# Patient Record
Sex: MALE | Race: WHITE | NOT HISPANIC OR LATINO | Employment: OTHER | URBAN - METROPOLITAN AREA
[De-identification: names, ages, dates, MRNs, and addresses within clinical notes are randomized per-mention and may not be internally consistent; named-entity substitution may affect disease eponyms.]

---

## 2018-01-25 ENCOUNTER — TRANSCRIBE ORDERS (OUTPATIENT)
Dept: ADMINISTRATIVE | Facility: HOSPITAL | Age: 48
End: 2018-01-25

## 2018-01-25 DIAGNOSIS — R07.89 OTHER CHEST PAIN: Primary | ICD-10-CM

## 2018-02-13 ENCOUNTER — TRANSCRIBE ORDERS (OUTPATIENT)
Dept: ADMINISTRATIVE | Facility: HOSPITAL | Age: 48
End: 2018-02-13

## 2018-02-13 DIAGNOSIS — R07.89 OTHER CHEST PAIN: Primary | ICD-10-CM

## 2018-02-14 ENCOUNTER — HOSPITAL ENCOUNTER (OUTPATIENT)
Dept: CT IMAGING | Facility: HOSPITAL | Age: 48
End: 2018-02-14

## 2018-02-14 ENCOUNTER — HOSPITAL ENCOUNTER (OUTPATIENT)
Dept: CT IMAGING | Facility: HOSPITAL | Age: 48
Discharge: HOME | End: 2018-02-14
Admitting: PHYSICIAN ASSISTANT

## 2018-02-14 VITALS
RESPIRATION RATE: 18 BRPM | HEIGHT: 70 IN | SYSTOLIC BLOOD PRESSURE: 103 MMHG | WEIGHT: 217 LBS | OXYGEN SATURATION: 96 % | HEART RATE: 58 BPM | DIASTOLIC BLOOD PRESSURE: 68 MMHG | TEMPERATURE: 96.8 F | BODY MASS INDEX: 31.07 KG/M2

## 2018-02-14 DIAGNOSIS — R07.89 OTHER CHEST PAIN: ICD-10-CM

## 2018-02-14 PROCEDURE — 63710000001 METOPROLOL TARTRATE 50 MG TABLET: Performed by: RADIOLOGY

## 2018-02-14 PROCEDURE — 75574 CT ANGIO HRT W/3D IMAGE: CPT

## 2018-02-14 PROCEDURE — A9270 NON-COVERED ITEM OR SERVICE: HCPCS | Performed by: RADIOLOGY

## 2018-02-14 PROCEDURE — 0 IOPAMIDOL PER 1 ML: Performed by: PHYSICIAN ASSISTANT

## 2018-02-14 PROCEDURE — 63710000001 NITROGLYCERIN 0.4 MG SUBLINGUAL TABLET: Performed by: RADIOLOGY

## 2018-02-14 RX ORDER — ATORVASTATIN CALCIUM 20 MG/1
20 TABLET, FILM COATED ORAL DAILY
COMMUNITY

## 2018-02-14 RX ORDER — ASPIRIN 81 MG/1
81 TABLET ORAL DAILY
COMMUNITY

## 2018-02-14 RX ORDER — CETIRIZINE HYDROCHLORIDE 10 MG/1
10 TABLET ORAL DAILY PRN
COMMUNITY

## 2018-02-14 RX ORDER — SODIUM CHLORIDE 0.9 % (FLUSH) 0.9 %
1-10 SYRINGE (ML) INJECTION AS NEEDED
Status: DISCONTINUED | OUTPATIENT
Start: 2018-02-14 | End: 2018-02-15 | Stop reason: HOSPADM

## 2018-02-14 RX ORDER — CYCLOBENZAPRINE HCL 10 MG
10 TABLET ORAL 2 TIMES DAILY PRN
COMMUNITY

## 2018-02-14 RX ORDER — OMEPRAZOLE 20 MG/1
20 CAPSULE, DELAYED RELEASE ORAL DAILY
COMMUNITY

## 2018-02-14 RX ORDER — METOPROLOL TARTRATE 50 MG/1
50 TABLET, FILM COATED ORAL
Status: COMPLETED | OUTPATIENT
Start: 2018-02-14 | End: 2018-02-14

## 2018-02-14 RX ORDER — NITROGLYCERIN 0.4 MG/1
TABLET SUBLINGUAL
Status: COMPLETED | OUTPATIENT
Start: 2018-02-14 | End: 2018-02-14

## 2018-02-14 RX ADMIN — IOPAMIDOL 65 ML: 755 INJECTION, SOLUTION INTRAVENOUS at 09:25

## 2018-02-14 RX ADMIN — METOPROLOL TARTRATE 50 MG: 50 TABLET ORAL at 07:35

## 2018-02-14 RX ADMIN — NITROGLYCERIN 0.4 MG: 0.4 TABLET SUBLINGUAL at 09:15

## 2018-02-14 NOTE — DISCHARGE INSTR - DIET
Resume your usual diet and medications.  Drink at least 8 - 10 glasses of fluid per day for the next few days.

## 2018-02-15 ENCOUNTER — TELEPHONE (OUTPATIENT)
Dept: INTERVENTIONAL RADIOLOGY/VASCULAR | Facility: HOSPITAL | Age: 48
End: 2018-02-15

## 2018-03-13 ENCOUNTER — APPOINTMENT (OUTPATIENT)
Dept: CT IMAGING | Facility: HOSPITAL | Age: 48
End: 2018-03-13

## 2018-12-28 ENCOUNTER — OFFICE (OUTPATIENT)
Dept: URBAN - METROPOLITAN AREA CLINIC 4 | Facility: CLINIC | Age: 48
End: 2018-12-28

## 2018-12-28 VITALS — DIASTOLIC BLOOD PRESSURE: 74 MMHG | HEIGHT: 69 IN | WEIGHT: 222 LBS | SYSTOLIC BLOOD PRESSURE: 109 MMHG

## 2018-12-28 DIAGNOSIS — Z12.11 ENCOUNTER FOR SCREENING FOR MALIGNANT NEOPLASM OF COLON: ICD-10-CM

## 2018-12-28 PROCEDURE — 99213 OFFICE O/P EST LOW 20 MIN: CPT | Performed by: NURSE PRACTITIONER

## 2019-01-08 ENCOUNTER — AMBULATORY SURGICAL CENTER (OUTPATIENT)
Dept: URBAN - METROPOLITAN AREA SURGERY 10 | Facility: SURGERY | Age: 49
End: 2019-01-08

## 2019-01-08 DIAGNOSIS — Z12.11 ENCOUNTER FOR SCREENING FOR MALIGNANT NEOPLASM OF COLON: ICD-10-CM

## 2019-01-08 DIAGNOSIS — K64.0 FIRST DEGREE HEMORRHOIDS: ICD-10-CM

## 2019-01-08 PROCEDURE — G0121 COLON CA SCRN NOT HI RSK IND: HCPCS | Performed by: INTERNAL MEDICINE

## 2021-11-23 ENCOUNTER — OFFICE (OUTPATIENT)
Dept: URBAN - METROPOLITAN AREA CLINIC 4 | Facility: CLINIC | Age: 51
End: 2021-11-23

## 2021-11-23 VITALS — DIASTOLIC BLOOD PRESSURE: 83 MMHG | SYSTOLIC BLOOD PRESSURE: 125 MMHG | WEIGHT: 222 LBS | HEIGHT: 69 IN

## 2021-11-23 DIAGNOSIS — K21.9 GASTRO-ESOPHAGEAL REFLUX DISEASE WITHOUT ESOPHAGITIS: ICD-10-CM

## 2021-11-23 PROCEDURE — 99214 OFFICE O/P EST MOD 30 MIN: CPT | Performed by: NURSE PRACTITIONER

## 2021-12-22 ENCOUNTER — AMBULATORY SURGICAL CENTER (OUTPATIENT)
Dept: URBAN - METROPOLITAN AREA SURGERY 10 | Facility: SURGERY | Age: 51
End: 2021-12-22

## 2021-12-22 ENCOUNTER — OFFICE (OUTPATIENT)
Dept: URBAN - METROPOLITAN AREA PATHOLOGY 4 | Facility: PATHOLOGY | Age: 51
End: 2021-12-22

## 2021-12-22 DIAGNOSIS — K22.4 DYSKINESIA OF ESOPHAGUS: ICD-10-CM

## 2021-12-22 DIAGNOSIS — K29.50 UNSPECIFIED CHRONIC GASTRITIS WITHOUT BLEEDING: ICD-10-CM

## 2021-12-22 DIAGNOSIS — F45.8 OTHER SOMATOFORM DISORDERS: ICD-10-CM

## 2021-12-22 DIAGNOSIS — J39.2 OTHER DISEASES OF PHARYNX: ICD-10-CM

## 2021-12-22 DIAGNOSIS — K44.9 DIAPHRAGMATIC HERNIA WITHOUT OBSTRUCTION OR GANGRENE: ICD-10-CM

## 2021-12-22 DIAGNOSIS — K21.9 GASTRO-ESOPHAGEAL REFLUX DISEASE WITHOUT ESOPHAGITIS: ICD-10-CM

## 2021-12-22 DIAGNOSIS — F17.220 NICOTINE DEPENDENCE, CHEWING TOBACCO, UNCOMPLICATED: ICD-10-CM

## 2021-12-22 DIAGNOSIS — R14.3 FLATULENCE: ICD-10-CM

## 2021-12-22 DIAGNOSIS — R12 HEARTBURN: ICD-10-CM

## 2021-12-22 DIAGNOSIS — R14.2 ERUCTATION: ICD-10-CM

## 2021-12-22 PROCEDURE — 43239 EGD BIOPSY SINGLE/MULTIPLE: CPT | Mod: 59 | Performed by: INTERNAL MEDICINE

## 2021-12-22 PROCEDURE — 43249 ESOPH EGD DILATION <30 MM: CPT | Performed by: INTERNAL MEDICINE

## 2021-12-22 PROCEDURE — 88342 IMHCHEM/IMCYTCHM 1ST ANTB: CPT | Mod: Q6 | Performed by: INTERNAL MEDICINE

## 2021-12-22 PROCEDURE — 88305 TISSUE EXAM BY PATHOLOGIST: CPT | Mod: Q6 | Performed by: INTERNAL MEDICINE

## 2022-02-21 ENCOUNTER — OFFICE (OUTPATIENT)
Dept: URBAN - METROPOLITAN AREA CLINIC 4 | Facility: CLINIC | Age: 52
End: 2022-02-21

## 2022-02-21 VITALS — WEIGHT: 233 LBS | SYSTOLIC BLOOD PRESSURE: 115 MMHG | HEIGHT: 69 IN | DIASTOLIC BLOOD PRESSURE: 83 MMHG

## 2022-02-21 DIAGNOSIS — K30 FUNCTIONAL DYSPEPSIA: ICD-10-CM

## 2022-02-21 DIAGNOSIS — R14.0 ABDOMINAL DISTENSION (GASEOUS): ICD-10-CM

## 2022-02-21 PROCEDURE — 99214 OFFICE O/P EST MOD 30 MIN: CPT | Performed by: NURSE PRACTITIONER

## 2022-04-05 ENCOUNTER — OFFICE (OUTPATIENT)
Dept: URBAN - METROPOLITAN AREA CLINIC 4 | Facility: CLINIC | Age: 52
End: 2022-04-05

## 2022-04-05 VITALS — WEIGHT: 235 LBS | SYSTOLIC BLOOD PRESSURE: 112 MMHG | DIASTOLIC BLOOD PRESSURE: 78 MMHG | HEIGHT: 69 IN

## 2022-04-05 DIAGNOSIS — K85.90 ACUTE PANCREATITIS WITHOUT NECROSIS OR INFECTION, UNSPECIFIE: ICD-10-CM

## 2022-04-05 DIAGNOSIS — K30 FUNCTIONAL DYSPEPSIA: ICD-10-CM

## 2022-04-05 PROCEDURE — 99214 OFFICE O/P EST MOD 30 MIN: CPT | Performed by: NURSE PRACTITIONER

## 2022-05-11 ENCOUNTER — OFFICE (OUTPATIENT)
Dept: URBAN - METROPOLITAN AREA CLINIC 4 | Facility: CLINIC | Age: 52
End: 2022-05-11

## 2022-05-11 VITALS — SYSTOLIC BLOOD PRESSURE: 113 MMHG | HEIGHT: 69 IN | DIASTOLIC BLOOD PRESSURE: 77 MMHG | WEIGHT: 226 LBS

## 2022-05-11 DIAGNOSIS — K76.0 FATTY (CHANGE OF) LIVER, NOT ELSEWHERE CLASSIFIED: ICD-10-CM

## 2022-05-11 DIAGNOSIS — R94.5 ABNORMAL RESULTS OF LIVER FUNCTION STUDIES: ICD-10-CM

## 2022-05-11 PROCEDURE — 99214 OFFICE O/P EST MOD 30 MIN: CPT | Performed by: NURSE PRACTITIONER

## 2022-10-05 ENCOUNTER — OFFICE (OUTPATIENT)
Dept: URBAN - METROPOLITAN AREA CLINIC 4 | Facility: CLINIC | Age: 52
End: 2022-10-05

## 2022-10-05 VITALS — HEIGHT: 69 IN | DIASTOLIC BLOOD PRESSURE: 78 MMHG | WEIGHT: 222 LBS | SYSTOLIC BLOOD PRESSURE: 115 MMHG

## 2022-10-05 DIAGNOSIS — R14.0 ABDOMINAL DISTENSION (GASEOUS): ICD-10-CM

## 2022-10-05 DIAGNOSIS — K85.90 ACUTE PANCREATITIS WITHOUT NECROSIS OR INFECTION, UNSPECIFIE: ICD-10-CM

## 2022-10-05 DIAGNOSIS — K76.0 FATTY (CHANGE OF) LIVER, NOT ELSEWHERE CLASSIFIED: ICD-10-CM

## 2022-10-05 DIAGNOSIS — K30 FUNCTIONAL DYSPEPSIA: ICD-10-CM

## 2022-10-05 DIAGNOSIS — R07.89 OTHER CHEST PAIN: ICD-10-CM

## 2022-10-05 DIAGNOSIS — R94.5 ABNORMAL RESULTS OF LIVER FUNCTION STUDIES: ICD-10-CM

## 2022-10-05 PROCEDURE — 99214 OFFICE O/P EST MOD 30 MIN: CPT | Performed by: NURSE PRACTITIONER

## 2022-10-05 RX ORDER — LORAZEPAM 0.5 MG/1
TABLET ORAL
Qty: 2 | Refills: 0 | Status: ACTIVE
Start: 2022-10-05

## 2022-12-14 ENCOUNTER — TRANSCRIBE ORDERS (OUTPATIENT)
Dept: NUTRITION | Facility: HOSPITAL | Age: 52
End: 2022-12-14

## 2022-12-14 DIAGNOSIS — IMO0002 RECURRENT PANCREATITIS: Primary | ICD-10-CM

## 2023-01-25 ENCOUNTER — HOSPITAL ENCOUNTER (OUTPATIENT)
Dept: NUTRITION | Facility: HOSPITAL | Age: 53
Setting detail: RECURRING SERIES
Discharge: HOME | End: 2023-01-25

## 2023-01-25 PROCEDURE — 97802 MEDICAL NUTRITION INDIV IN: CPT

## 2023-01-26 NOTE — CONSULTS
[facility] Nutrition Services          Initial 60 Minute Nutrition Visit    Date: 2023   Patient Name: Aamir Rodriguez  : 1970   MRN: [id number]   Referring Provider: Juan Ramon Bianchi,*    Reason for Visit: pancreatitis; pt is also being treated for pre-DM  Visit Format: ZOOM    Nutrition Assessment       Social History:   Social History     Socioeconomic History   • Marital status:      Spouse name: Cherelle   Tobacco Use   • Smoking status: Never   • Smokeless tobacco: Former     Quit date:    Substance and Sexual Activity   • Alcohol use: No   • Drug use: No   • Sexual activity: Defer     Active Problem List: There are no problems to display for this patient.     Current Medications:   Current Outpatient Medications:   •  aspirin 81 MG EC tablet, Take 81 mg by mouth Daily., Disp: , Rfl:   •  atorvastatin (LIPITOR) 20 MG tablet, Take 20 mg by mouth Daily., Disp: , Rfl:   •  cetirizine (zyrTEC) 10 MG tablet, Take 10 mg by mouth Daily As Needed for Allergies., Disp: , Rfl:   •  cyclobenzaprine (FLEXERIL) 10 MG tablet, Take 10 mg by mouth 2 (Two) Times a Day As Needed for Muscle Spasms., Disp: , Rfl:   •  Multiple Vitamins-Minerals (MULTIVITAMIN ADULT PO), Take 1 tablet by mouth Daily., Disp: , Rfl:   •  omeprazole (priLOSEC) 20 MG capsule, Take 20 mg by mouth Daily., Disp: , Rfl:     Labs: A1c not listed on referral     Hunger Vital Sign Food Insecurity Assessment:  Within the past 12 months I/we worried whether our food would run out before I/we got money to buy more: unknown   Within the past 12 months the food I/we bought just didn't last and I/we didn't have money to get more: unknown   Use of food assistance programs (WIC, food stamps, food snyder) no       Food & Nutrition Related History       Food Allergies: None indicated  Food Intolerances: None indicated  Food Behavior: None indicated  Nutrition Impact Symptoms: None indicated  Gastrointestinal conditions that  "impact intake or food choices: None  Details at home: unemployed right now  Who prepares most meals: He does   Who does grocery shopping: He does   How many meals are purchased from fast food/sit down restaurants per week: seldom  Difficulty chewin - Normal  Difficulty swallowin - Normal  Diet requirement related to personal preference or cultural belief: None indicated  History of eating disorder/disordered eating habits: None  Language/communication details: english  Barriers to learning: No barriers identified at this time    24 Hour Recall:   Time Food/beverages consumed   B Banana nut muffin  Sweet tea       L Frozen pizza  Sweet tea       D Canes  Sweet tea       S brownie         Additional comments: Pt eats 3 meals/day, uses Creon at each meal. Pt says his biggest challenge is sweet tea and fried foods and he usually gets hungry around 9 PM since he and his wife eat dinner around 5:30 PM. Pt will have a soda every now and then but for the most part has cut soda out. Pt does want to lose some weight. Pt was advised not to eat fruit, salad and non-cooked vegetables. For pancreatitis pt can have fruit, raw vegetables and fruit. RD explained this information was not accurate.     Anthropometrics      Height:   Ht Readings from Last 1 Encounters:   18 177.8 cm (70\")     Weight:   Wt Readings from Last 3 Encounters:   18 98.4 kg (217 lb)     BMI: There is no height or weight on file to calculate BMI.   Weight Change: Pt has gained some weight over the past few years      Physical Activity     Barriers to physical activity: None indicated     Physical activity comments: Pt enjoys walking his dog everyday for about 20-30 minutes    Estimated Needs     Estimated Energy Needs: did not assess needs at this time    Estimated Protein Needs: did not assess needs at this time     Estimated Fluid Needs: did not assess needs at this time but did encourage water intake with electrolyte powder for adequate " hydration      Discussion / Education      Low fat intake: We discussed how the pancreas helps the body absorb and digest nutrients in food. With pancreatitis, your body may not be able to digest food as well. The fat in food is especially hard to digest and may cause some pain and other unwanted symptoms. RD encouraged to consume whole grains, fruit, vegetables, meat and beans and some dairy and moderate fat intake. RD encouraged choosing options like baked, broiled instead of fried.       Consistency of meals: We discussed the importance of eating consistent, balanced meals to meet nutrient needs to promote satiety and satisfaction when eating. RD recommended patient to try and incorporate a small meal or snack in the morning and to avoid overeating and snacking throughout the day. RD explained meal patterns should be individualized from person to person. We discussed how sometimes cravings are trigger due to skipping meals and not incorporating all 3 categories of nutrients.      The components of a healthy meal and snack: We discussed how the three main nutrients our bodies need are protein, carbohydrates, and fat. RD recommended the patient aim to have a source of lean protein, fiber rich carbohydrates, and heart healthy fats with each of his meals and snacks. RD provided examples, such as having some yogurt with his sandwich for additional carbohydrates and protein rather than just having a sandwich by itself. We discussed the benefits this provides, such as meeting nutrient needs, and promoting satiety and satisfaction when eating    Assessment of patient engagement: Asked appropriate questions    Measurement of understanding: Patient able to demonstrate understanding with teach back     Resources Provided: RD emailed resources after session: Eating to feel your best, Nutrition Therapy for Pancreatitis       Goal (s)      Goal 1: Incorporating a serving of vegetables for at least 2 meals a day.     Goal 2:  When choosing a snack, choosing a balanced snack    Plan of Care     PES Statement:   Altered GI function related to diagnosis of pancreatitis as evidence by body not able to break down and absorb nutrients, especially high fat options.     Follow Up Visit      Follow Up:   3/2 at 3:45 PM    Total of 60 minutes spent with patient on nutrition counseling. Education based on Academy of Nutrition and Dietetics guidelines. Patient was provided with RD's contact information. Thank you for this referral.      Electronically signed by:  Karen Hays RD  01/26/23 08:17 EST

## 2023-03-30 ENCOUNTER — OFFICE (OUTPATIENT)
Dept: URBAN - METROPOLITAN AREA CLINIC 4 | Facility: CLINIC | Age: 53
End: 2023-03-30

## 2023-03-30 VITALS — HEIGHT: 69 IN | DIASTOLIC BLOOD PRESSURE: 77 MMHG | SYSTOLIC BLOOD PRESSURE: 126 MMHG | WEIGHT: 233 LBS

## 2023-03-30 DIAGNOSIS — K30 FUNCTIONAL DYSPEPSIA: ICD-10-CM

## 2023-03-30 DIAGNOSIS — R63.5 ABNORMAL WEIGHT GAIN: ICD-10-CM

## 2023-03-30 DIAGNOSIS — K76.0 FATTY (CHANGE OF) LIVER, NOT ELSEWHERE CLASSIFIED: ICD-10-CM

## 2023-03-30 DIAGNOSIS — K85.90 ACUTE PANCREATITIS WITHOUT NECROSIS OR INFECTION, UNSPECIFIE: ICD-10-CM

## 2023-03-30 DIAGNOSIS — R94.5 ABNORMAL RESULTS OF LIVER FUNCTION STUDIES: ICD-10-CM

## 2023-03-30 PROCEDURE — 99214 OFFICE O/P EST MOD 30 MIN: CPT | Performed by: NURSE PRACTITIONER

## 2023-10-02 ENCOUNTER — OFFICE (OUTPATIENT)
Dept: URBAN - METROPOLITAN AREA CLINIC 4 | Facility: CLINIC | Age: 53
End: 2023-10-02

## 2023-10-02 VITALS — DIASTOLIC BLOOD PRESSURE: 71 MMHG | HEIGHT: 69 IN | SYSTOLIC BLOOD PRESSURE: 110 MMHG | WEIGHT: 230 LBS

## 2023-10-02 DIAGNOSIS — K85.90 ACUTE PANCREATITIS WITHOUT NECROSIS OR INFECTION, UNSPECIFIE: ICD-10-CM

## 2023-10-02 DIAGNOSIS — K76.0 FATTY (CHANGE OF) LIVER, NOT ELSEWHERE CLASSIFIED: ICD-10-CM

## 2023-10-02 DIAGNOSIS — K30 FUNCTIONAL DYSPEPSIA: ICD-10-CM

## 2023-10-02 DIAGNOSIS — R15.0 INCOMPLETE DEFECATION: ICD-10-CM

## 2023-10-02 PROCEDURE — 99214 OFFICE O/P EST MOD 30 MIN: CPT | Performed by: NURSE PRACTITIONER

## 2024-03-27 ENCOUNTER — OFFICE (OUTPATIENT)
Dept: URBAN - METROPOLITAN AREA CLINIC 4 | Facility: CLINIC | Age: 54
End: 2024-03-27

## 2024-03-27 VITALS — HEIGHT: 69 IN | DIASTOLIC BLOOD PRESSURE: 78 MMHG | WEIGHT: 239 LBS | SYSTOLIC BLOOD PRESSURE: 124 MMHG

## 2024-03-27 DIAGNOSIS — K76.0 FATTY (CHANGE OF) LIVER, NOT ELSEWHERE CLASSIFIED: ICD-10-CM

## 2024-03-27 DIAGNOSIS — K85.90 ACUTE PANCREATITIS WITHOUT NECROSIS OR INFECTION, UNSPECIFIE: ICD-10-CM

## 2024-03-27 DIAGNOSIS — K30 FUNCTIONAL DYSPEPSIA: ICD-10-CM

## 2024-03-27 PROCEDURE — 99214 OFFICE O/P EST MOD 30 MIN: CPT | Performed by: NURSE PRACTITIONER

## 2024-09-07 ENCOUNTER — APPOINTMENT (OUTPATIENT)
Dept: CT IMAGING | Facility: HOSPITAL | Age: 54
End: 2024-09-07
Payer: MEDICARE

## 2024-09-07 ENCOUNTER — HOSPITAL ENCOUNTER (EMERGENCY)
Facility: HOSPITAL | Age: 54
Discharge: HOME | End: 2024-09-07
Attending: STUDENT IN AN ORGANIZED HEALTH CARE EDUCATION/TRAINING PROGRAM
Payer: MEDICARE

## 2024-09-07 VITALS
DIASTOLIC BLOOD PRESSURE: 68 MMHG | RESPIRATION RATE: 20 BRPM | WEIGHT: 232 LBS | SYSTOLIC BLOOD PRESSURE: 122 MMHG | HEART RATE: 82 BPM | TEMPERATURE: 97.3 F | HEIGHT: 69 IN | BODY MASS INDEX: 34.36 KG/M2 | OXYGEN SATURATION: 95 %

## 2024-09-07 DIAGNOSIS — N20.0 KIDNEY STONE: Primary | ICD-10-CM

## 2024-09-07 DIAGNOSIS — N20.1 URETEROLITHIASIS: ICD-10-CM

## 2024-09-07 LAB
ALBUMIN SERPL-MCNC: 4.4 G/DL (ref 3.5–5.2)
ALBUMIN/GLOB SERPL: 1.5 G/DL
ALP SERPL-CCNC: 87 U/L (ref 39–117)
ALT SERPL W P-5'-P-CCNC: 61 U/L (ref 1–41)
ANION GAP SERPL CALCULATED.3IONS-SCNC: 11 MMOL/L (ref 5–15)
AST SERPL-CCNC: 36 U/L (ref 1–40)
BACTERIA UR QL AUTO: ABNORMAL /HPF
BASOPHILS # BLD AUTO: 0.08 10*3/MM3 (ref 0–0.2)
BASOPHILS NFR BLD AUTO: 1.1 % (ref 0–1.5)
BILIRUB SERPL-MCNC: 0.6 MG/DL (ref 0–1.2)
BILIRUB UR QL STRIP: NEGATIVE
BUN SERPL-MCNC: 10 MG/DL (ref 6–20)
BUN/CREAT SERPL: 9.1 (ref 7–25)
CALCIUM SPEC-SCNC: 9.4 MG/DL (ref 8.6–10.5)
CHLORIDE SERPL-SCNC: 103 MMOL/L (ref 98–107)
CLARITY UR: ABNORMAL
CO2 SERPL-SCNC: 27 MMOL/L (ref 22–29)
COLOR UR: YELLOW
CREAT SERPL-MCNC: 1.1 MG/DL (ref 0.76–1.27)
D-LACTATE SERPL-SCNC: 1.7 MMOL/L (ref 0.5–2)
D-LACTATE SERPL-SCNC: 2.4 MMOL/L (ref 0.5–2)
DEPRECATED RDW RBC AUTO: 44.6 FL (ref 37–54)
EGFRCR SERPLBLD CKD-EPI 2021: 79.8 ML/MIN/1.73
EOSINOPHIL # BLD AUTO: 0.36 10*3/MM3 (ref 0–0.4)
EOSINOPHIL NFR BLD AUTO: 5 % (ref 0.3–6.2)
ERYTHROCYTE [DISTWIDTH] IN BLOOD BY AUTOMATED COUNT: 13.2 % (ref 12.3–15.4)
GLOBULIN UR ELPH-MCNC: 3 GM/DL
GLUCOSE SERPL-MCNC: 144 MG/DL (ref 65–99)
GLUCOSE UR STRIP-MCNC: NEGATIVE MG/DL
HCT VFR BLD AUTO: 44.4 % (ref 37.5–51)
HGB BLD-MCNC: 14.9 G/DL (ref 13–17.7)
HGB UR QL STRIP.AUTO: ABNORMAL
HOLD SPECIMEN: NORMAL
HOLD SPECIMEN: NORMAL
HYALINE CASTS UR QL AUTO: ABNORMAL /LPF
IMM GRANULOCYTES # BLD AUTO: 0.02 10*3/MM3 (ref 0–0.05)
IMM GRANULOCYTES NFR BLD AUTO: 0.3 % (ref 0–0.5)
KETONES UR QL STRIP: NEGATIVE
LEUKOCYTE ESTERASE UR QL STRIP.AUTO: NEGATIVE
LIPASE SERPL-CCNC: 16 U/L (ref 13–60)
LYMPHOCYTES # BLD AUTO: 3.05 10*3/MM3 (ref 0.7–3.1)
LYMPHOCYTES NFR BLD AUTO: 42.5 % (ref 19.6–45.3)
MCH RBC QN AUTO: 30.7 PG (ref 26.6–33)
MCHC RBC AUTO-ENTMCNC: 33.6 G/DL (ref 31.5–35.7)
MCV RBC AUTO: 91.4 FL (ref 79–97)
MONOCYTES # BLD AUTO: 0.73 10*3/MM3 (ref 0.1–0.9)
MONOCYTES NFR BLD AUTO: 10.2 % (ref 5–12)
NEUTROPHILS NFR BLD AUTO: 2.93 10*3/MM3 (ref 1.7–7)
NEUTROPHILS NFR BLD AUTO: 40.9 % (ref 42.7–76)
NITRITE UR QL STRIP: NEGATIVE
NRBC BLD AUTO-RTO: 0 /100 WBC (ref 0–0.2)
PH UR STRIP.AUTO: 6 [PH] (ref 5–8)
PLATELET # BLD AUTO: 257 10*3/MM3 (ref 140–450)
PMV BLD AUTO: 9.4 FL (ref 6–12)
POTASSIUM SERPL-SCNC: 4 MMOL/L (ref 3.5–5.2)
PROT SERPL-MCNC: 7.4 G/DL (ref 6–8.5)
PROT UR QL STRIP: ABNORMAL
RBC # BLD AUTO: 4.86 10*6/MM3 (ref 4.14–5.8)
RBC # UR STRIP: ABNORMAL /HPF
REF LAB TEST METHOD: ABNORMAL
SODIUM SERPL-SCNC: 141 MMOL/L (ref 136–145)
SP GR UR STRIP: >1.03 (ref 1–1.03)
SQUAMOUS #/AREA URNS HPF: ABNORMAL /HPF
UROBILINOGEN UR QL STRIP: ABNORMAL
WBC # UR STRIP: ABNORMAL /HPF
WBC NRBC COR # BLD AUTO: 7.17 10*3/MM3 (ref 3.4–10.8)
WHOLE BLOOD HOLD COAG: NORMAL
WHOLE BLOOD HOLD SPECIMEN: NORMAL

## 2024-09-07 PROCEDURE — 25010000002 HYDROMORPHONE PER 4 MG: Performed by: STUDENT IN AN ORGANIZED HEALTH CARE EDUCATION/TRAINING PROGRAM

## 2024-09-07 PROCEDURE — 99285 EMERGENCY DEPT VISIT HI MDM: CPT

## 2024-09-07 PROCEDURE — 74177 CT ABD & PELVIS W/CONTRAST: CPT

## 2024-09-07 PROCEDURE — 81001 URINALYSIS AUTO W/SCOPE: CPT | Performed by: STUDENT IN AN ORGANIZED HEALTH CARE EDUCATION/TRAINING PROGRAM

## 2024-09-07 PROCEDURE — 25010000002 ONDANSETRON PER 1 MG: Performed by: STUDENT IN AN ORGANIZED HEALTH CARE EDUCATION/TRAINING PROGRAM

## 2024-09-07 PROCEDURE — 83605 ASSAY OF LACTIC ACID: CPT | Performed by: STUDENT IN AN ORGANIZED HEALTH CARE EDUCATION/TRAINING PROGRAM

## 2024-09-07 PROCEDURE — 80053 COMPREHEN METABOLIC PANEL: CPT | Performed by: STUDENT IN AN ORGANIZED HEALTH CARE EDUCATION/TRAINING PROGRAM

## 2024-09-07 PROCEDURE — 25510000001 IOPAMIDOL 61 % SOLUTION: Performed by: STUDENT IN AN ORGANIZED HEALTH CARE EDUCATION/TRAINING PROGRAM

## 2024-09-07 PROCEDURE — 25810000003 LACTATED RINGERS SOLUTION: Performed by: STUDENT IN AN ORGANIZED HEALTH CARE EDUCATION/TRAINING PROGRAM

## 2024-09-07 PROCEDURE — 96374 THER/PROPH/DIAG INJ IV PUSH: CPT

## 2024-09-07 PROCEDURE — 96375 TX/PRO/DX INJ NEW DRUG ADDON: CPT

## 2024-09-07 PROCEDURE — 25010000002 KETOROLAC TROMETHAMINE PER 15 MG: Performed by: STUDENT IN AN ORGANIZED HEALTH CARE EDUCATION/TRAINING PROGRAM

## 2024-09-07 PROCEDURE — 36415 COLL VENOUS BLD VENIPUNCTURE: CPT

## 2024-09-07 PROCEDURE — 85025 COMPLETE CBC W/AUTO DIFF WBC: CPT | Performed by: STUDENT IN AN ORGANIZED HEALTH CARE EDUCATION/TRAINING PROGRAM

## 2024-09-07 PROCEDURE — 83690 ASSAY OF LIPASE: CPT | Performed by: STUDENT IN AN ORGANIZED HEALTH CARE EDUCATION/TRAINING PROGRAM

## 2024-09-07 RX ORDER — TAMSULOSIN HYDROCHLORIDE 0.4 MG/1
0.4 CAPSULE ORAL ONCE
Status: COMPLETED | OUTPATIENT
Start: 2024-09-07 | End: 2024-09-07

## 2024-09-07 RX ORDER — HYDROCODONE BITARTRATE AND ACETAMINOPHEN 5; 325 MG/1; MG/1
1 TABLET ORAL ONCE
Status: COMPLETED | OUTPATIENT
Start: 2024-09-07 | End: 2024-09-07

## 2024-09-07 RX ORDER — IBUPROFEN 600 MG/1
600 TABLET, FILM COATED ORAL ONCE
Status: COMPLETED | OUTPATIENT
Start: 2024-09-07 | End: 2024-09-07

## 2024-09-07 RX ORDER — SODIUM CHLORIDE 0.9 % (FLUSH) 0.9 %
10 SYRINGE (ML) INJECTION AS NEEDED
Status: DISCONTINUED | OUTPATIENT
Start: 2024-09-07 | End: 2024-09-07 | Stop reason: HOSPADM

## 2024-09-07 RX ORDER — ONDANSETRON 4 MG/1
4 TABLET, ORALLY DISINTEGRATING ORAL 4 TIMES DAILY PRN
Qty: 12 TABLET | Refills: 0 | Status: SHIPPED | OUTPATIENT
Start: 2024-09-07

## 2024-09-07 RX ORDER — OXYCODONE HYDROCHLORIDE 5 MG/1
5 TABLET ORAL EVERY 4 HOURS PRN
Qty: 6 TABLET | Refills: 0 | Status: SHIPPED | OUTPATIENT
Start: 2024-09-07

## 2024-09-07 RX ORDER — IOPAMIDOL 612 MG/ML
100 INJECTION, SOLUTION INTRAVASCULAR
Status: COMPLETED | OUTPATIENT
Start: 2024-09-07 | End: 2024-09-07

## 2024-09-07 RX ORDER — IBUPROFEN 600 MG/1
600 TABLET, FILM COATED ORAL EVERY 6 HOURS PRN
Qty: 20 TABLET | Refills: 0 | Status: SHIPPED | OUTPATIENT
Start: 2024-09-07

## 2024-09-07 RX ORDER — ACETAMINOPHEN 500 MG
500 TABLET ORAL EVERY 6 HOURS PRN
Qty: 20 TABLET | Refills: 0 | Status: SHIPPED | OUTPATIENT
Start: 2024-09-07

## 2024-09-07 RX ORDER — HYDROMORPHONE HYDROCHLORIDE 2 MG/ML
0.5 INJECTION, SOLUTION INTRAMUSCULAR; INTRAVENOUS; SUBCUTANEOUS ONCE
Status: COMPLETED | OUTPATIENT
Start: 2024-09-07 | End: 2024-09-07

## 2024-09-07 RX ORDER — ONDANSETRON 2 MG/ML
4 INJECTION INTRAMUSCULAR; INTRAVENOUS ONCE
Status: COMPLETED | OUTPATIENT
Start: 2024-09-07 | End: 2024-09-07

## 2024-09-07 RX ORDER — TAMSULOSIN HYDROCHLORIDE 0.4 MG/1
1 CAPSULE ORAL NIGHTLY
Qty: 14 CAPSULE | Refills: 0 | Status: SHIPPED | OUTPATIENT
Start: 2024-09-07

## 2024-09-07 RX ORDER — KETOROLAC TROMETHAMINE 30 MG/ML
15 INJECTION, SOLUTION INTRAMUSCULAR; INTRAVENOUS ONCE
Status: COMPLETED | OUTPATIENT
Start: 2024-09-07 | End: 2024-09-07

## 2024-09-07 RX ADMIN — IBUPROFEN 600 MG: 600 TABLET ORAL at 11:46

## 2024-09-07 RX ADMIN — KETOROLAC TROMETHAMINE 15 MG: 30 INJECTION, SOLUTION INTRAMUSCULAR; INTRAVENOUS at 08:15

## 2024-09-07 RX ADMIN — SODIUM CHLORIDE, POTASSIUM CHLORIDE, SODIUM LACTATE AND CALCIUM CHLORIDE 1000 ML: 600; 310; 30; 20 INJECTION, SOLUTION INTRAVENOUS at 08:08

## 2024-09-07 RX ADMIN — HYDROCODONE BITARTRATE AND ACETAMINOPHEN 1 TABLET: 5; 325 TABLET ORAL at 11:46

## 2024-09-07 RX ADMIN — TAMSULOSIN HYDROCHLORIDE 0.4 MG: 0.4 CAPSULE ORAL at 08:15

## 2024-09-07 RX ADMIN — ONDANSETRON 4 MG: 2 INJECTION INTRAMUSCULAR; INTRAVENOUS at 07:44

## 2024-09-07 RX ADMIN — HYDROMORPHONE HYDROCHLORIDE 0.5 MG: 2 INJECTION, SOLUTION INTRAMUSCULAR; INTRAVENOUS; SUBCUTANEOUS at 07:43

## 2024-09-07 RX ADMIN — IOPAMIDOL 100 ML: 612 INJECTION, SOLUTION INTRAVENOUS at 08:05

## 2024-09-07 NOTE — ED PROVIDER NOTES
[facility] EMERGENCY DEPARTMENT  Emergency Department Encounter  Emergency Medicine Physician Note       Pt Name: Aamir Rodriguez  MRN: [id number]  Pt :   1970  Room Number:    Date of encounter:  2024  PCP: Juan Ramon Bianchi MD  ED Provider: Rafiq Teague MD    Historian: Patient      HPI:  Chief Complaint: Flank pain        Context: Aamir Rodriguez is a 54 y.o. male who presents to the ED for right flank pain.  Symptoms started this morning.  He has associated nausea.  States feels similar to prior kidney stones.  No dysuria.  No fevers but reports feeling sweaty.  Reports prior history of kidney stone.      PAST MEDICAL HISTORY  Past Medical History:   Diagnosis Date    Arthritis     GERD (gastroesophageal reflux disease)     Kidney stones     Pancreatitis 2016    Stroke          PAST SURGICAL HISTORY  Past Surgical History:   Procedure Laterality Date    KNEE ARTHROSCOPY Bilateral     RECONSTRUCTION OF NOSE      REPLACEMENT TOTAL KNEE BILATERAL      rt x 2    SHOULDER ARTHROSCOPY Bilateral     x 3    TEMPOROMANDIBULAR JOINT SURGERY      TONSILLECTOMY AND ADENOIDECTOMY      VASECTOMY           FAMILY HISTORY  History reviewed. No pertinent family history.      SOCIAL HISTORY  Social History     Socioeconomic History    Marital status:      Spouse name: Cherelle   Tobacco Use    Smoking status: Never    Smokeless tobacco: Former     Quit date:    Substance and Sexual Activity    Alcohol use: No    Drug use: No    Sexual activity: Defer         ALLERGIES  Morphine and codeine, Oxycontin [oxycodone hcl], and Percocet [oxycodone-acetaminophen]        REVIEW OF SYSTEMS  Systems reviewed and negative      PHYSICAL EXAM    I have reviewed the triage vital signs and nursing notes.    ED Triage Vitals [24 0652]   Temp Heart Rate Resp BP SpO2   97.3 °F (36.3 °C) 82 20 147/98 95 %      Temp src Heart Rate Source Patient Position BP Location FiO2 (%)   Oral  Monitor Sitting Left arm --       Physical Exam  Constitutional:       General: He is not in acute distress.     Appearance: He is diaphoretic.   Cardiovascular:      Rate and Rhythm: Normal rate.   Pulmonary:      Effort: Pulmonary effort is normal.   Abdominal:      Palpations: Abdomen is soft.      Tenderness: There is abdominal tenderness in the right lower quadrant. There is right CVA tenderness.      Comments: No zoster rash   Skin:     General: Skin is warm.   Neurological:      General: No focal deficit present.      Mental Status: He is alert.         LAB RESULTS  Recent Results (from the past 24 hour(s))   Comprehensive Metabolic Panel    Collection Time: 09/07/24  7:21 AM    Specimen: Blood   Result Value Ref Range    Glucose 144 (H) 65 - 99 mg/dL    BUN 10 6 - 20 mg/dL    Creatinine 1.10 0.76 - 1.27 mg/dL    Sodium 141 136 - 145 mmol/L    Potassium 4.0 3.5 - 5.2 mmol/L    Chloride 103 98 - 107 mmol/L    CO2 27.0 22.0 - 29.0 mmol/L    Calcium 9.4 8.6 - 10.5 mg/dL    Total Protein 7.4 6.0 - 8.5 g/dL    Albumin 4.4 3.5 - 5.2 g/dL    ALT (SGPT) 61 (H) 1 - 41 U/L    AST (SGOT) 36 1 - 40 U/L    Alkaline Phosphatase 87 39 - 117 U/L    Total Bilirubin 0.6 0.0 - 1.2 mg/dL    Globulin 3.0 gm/dL    A/G Ratio 1.5 g/dL    BUN/Creatinine Ratio 9.1 7.0 - 25.0    Anion Gap 11.0 5.0 - 15.0 mmol/L    eGFR 79.8 >60.0 mL/min/1.73   Lipase    Collection Time: 09/07/24  7:21 AM    Specimen: Blood   Result Value Ref Range    Lipase 16 13 - 60 U/L   Lactic Acid, Plasma    Collection Time: 09/07/24  7:21 AM    Specimen: Blood   Result Value Ref Range    Lactate 2.4 (C) 0.5 - 2.0 mmol/L   Green Top (Gel)    Collection Time: 09/07/24  7:21 AM   Result Value Ref Range    Extra Tube Hold for add-ons.    Lavender Top    Collection Time: 09/07/24  7:21 AM   Result Value Ref Range    Extra Tube hold for add-on    Gold Top - SST    Collection Time: 09/07/24  7:21 AM   Result Value Ref Range    Extra Tube Hold for add-ons.    Light Blue  Top    Collection Time: 09/07/24  7:21 AM   Result Value Ref Range    Extra Tube Hold for add-ons.    CBC Auto Differential    Collection Time: 09/07/24  7:21 AM    Specimen: Blood   Result Value Ref Range    WBC 7.17 3.40 - 10.80 10*3/mm3    RBC 4.86 4.14 - 5.80 10*6/mm3    Hemoglobin 14.9 13.0 - 17.7 g/dL    Hematocrit 44.4 37.5 - 51.0 %    MCV 91.4 79.0 - 97.0 fL    MCH 30.7 26.6 - 33.0 pg    MCHC 33.6 31.5 - 35.7 g/dL    RDW 13.2 12.3 - 15.4 %    RDW-SD 44.6 37.0 - 54.0 fl    MPV 9.4 6.0 - 12.0 fL    Platelets 257 140 - 450 10*3/mm3    Neutrophil % 40.9 (L) 42.7 - 76.0 %    Lymphocyte % 42.5 19.6 - 45.3 %    Monocyte % 10.2 5.0 - 12.0 %    Eosinophil % 5.0 0.3 - 6.2 %    Basophil % 1.1 0.0 - 1.5 %    Immature Grans % 0.3 0.0 - 0.5 %    Neutrophils, Absolute 2.93 1.70 - 7.00 10*3/mm3    Lymphocytes, Absolute 3.05 0.70 - 3.10 10*3/mm3    Monocytes, Absolute 0.73 0.10 - 0.90 10*3/mm3    Eosinophils, Absolute 0.36 0.00 - 0.40 10*3/mm3    Basophils, Absolute 0.08 0.00 - 0.20 10*3/mm3    Immature Grans, Absolute 0.02 0.00 - 0.05 10*3/mm3    nRBC 0.0 0.0 - 0.2 /100 WBC   Urinalysis With Microscopic If Indicated (No Culture) - Urine, Clean Catch    Collection Time: 09/07/24  9:32 AM    Specimen: Urine, Clean Catch   Result Value Ref Range    Color, UA Yellow Yellow, Straw    Appearance, UA Cloudy (A) Clear    pH, UA 6.0 5.0 - 8.0    Specific Gravity, UA >1.030 (H) 1.005 - 1.030    Glucose, UA Negative Negative    Ketones, UA Negative Negative    Bilirubin, UA Negative Negative    Blood, UA Large (3+) (A) Negative    Protein, UA 30 mg/dL (1+) (A) Negative    Leuk Esterase, UA Negative Negative    Nitrite, UA Negative Negative    Urobilinogen, UA 0.2 E.U./dL 0.2 - 1.0 E.U./dL   Urinalysis, Microscopic Only - Urine, Clean Catch    Collection Time: 09/07/24  9:32 AM    Specimen: Urine, Clean Catch   Result Value Ref Range    RBC, UA Too Numerous to Count (A) None Seen, 0-2 /HPF    WBC, UA 0-2 None Seen, 0-2 /HPF     Bacteria, UA 1+ (A) None Seen /HPF    Squamous Epithelial Cells, UA 0-2 None Seen, 0-2 /HPF    Hyaline Casts, UA None Seen None Seen /LPF    Methodology Manual Light Microscopy    STAT Lactic Acid, Reflex    Collection Time: 09/07/24 12:34 PM    Specimen: Blood   Result Value Ref Range    Lactate 1.7 0.5 - 2.0 mmol/L       If labs were ordered, I independently reviewed the results and considered them in treating the patient.        RADIOLOGY  CT Abdomen Pelvis With Contrast    Result Date: 9/7/2024  PROCEDURE: CT ABDOMEN PELVIS W CONTRAST-  HISTORY: R flank and abdomen pain, eval nephrolithiasis  Comparison: None  FINDINGS: Axial CT images of the abdomen and pelvis were obtained with IV contrast only. Coronal reformatted images were also obtained. This study was performed with techniques to keep radiation doses as low as reasonably achievable, (ALARA). Individualized dose reduction techniques using automated exposure control or adjustment of mA and/or kV according to the patient size were employed.  The lung bases are clear. The liver has an unremarkable appearance, without evidence of mass. The gallbladder appears normal without evidence of gallstones. There is no evidence of biliary ductal dilatation. The pancreas appears normal. The spleen size is within normal limits. No renal stone is identified. There is mild right hydronephrosis. A 5 mm proximal right ureteral stone is seen at the L3 level. There is no evidence of adenopathy. No abnormal fluid collection is seen. No localized inflammatory process is identified.  Images of the pelvis reveal no evidence of mass or adenopathy. No abnormal fluid collection is seen. The appendix is unremarkable. No localized inflammatory process is seen.      Mild right hydronephrosis secondary to a 5 mm proximal right ureteral stone.      CTDI: 15.08 mGy DLP:706.07 mGy.cm  This report was signed and finalized on 9/7/2024 8:39 AM by Rex Vazquez MD.        PROCEDURES    Procedures    No orders to display       MEDICATIONS GIVEN IN ER    Medications   HYDROmorphone (DILAUDID) injection 0.5 mg (0.5 mg Intravenous Given 9/7/24 0743)   ondansetron (ZOFRAN) injection 4 mg (4 mg Intravenous Given 9/7/24 0744)   lactated ringers bolus 1,000 mL (0 mL Intravenous Stopped 9/7/24 0931)   iopamidol (ISOVUE-300) 61 % injection 100 mL (100 mL Intravenous Given 9/7/24 0805)   ketorolac (TORADOL) injection 15 mg (15 mg Intravenous Given 9/7/24 0815)   tamsulosin (FLOMAX) 24 hr capsule 0.4 mg (0.4 mg Oral Given 9/7/24 0815)   HYDROcodone-acetaminophen (NORCO) 5-325 MG per tablet 1 tablet (1 tablet Oral Given 9/7/24 1146)   ibuprofen (ADVIL,MOTRIN) tablet 600 mg (600 mg Oral Given 9/7/24 1146)         MEDICAL DECISION MAKING, PROGRESS, and CONSULTS    All labs, if obtained, have been independently reviewed by me.  All radiology studies, if obtained, have been reviewed by me and the radiologist dictating the report.  All EKG's, if obtained, have been independently viewed and interpreted by me.      Discussion below represents my analysis of pertinent findings related to patient's condition, differential diagnosis, treatment plan and final disposition.                         Differential diagnosis:    Kidney stone, JOHANNE, UTI, bowel obstruction, others.      Additional sources:    - Discussed/ obtained information from independent historians:      - External (non-ED) record review: Outside ED note 6/26/2023    - Chronic or social conditions impacting care:      - Shared decision making:        Orders placed during this visit:  Orders Placed This Encounter   Procedures    CT Abdomen Pelvis With Contrast    [place] Draw    Comprehensive Metabolic Panel    Lipase    Urinalysis With Microscopic If Indicated (No Culture) - Urine, Clean Catch    Lactic Acid, Plasma    CBC Auto Differential    STAT Lactic Acid, Reflex    Urinalysis, Microscopic Only - Urine, Clean Catch    CBC & Differential     Green Top (Gel)    Lavender Top    Gold Top - SST    Light Blue Top         Additional orders considered but not ordered:      ED Course/MDM Discussion:    Patient is a 54-year-old male who presented with right flank pain acute onset this morning.  Vital signs stable on presentation.  CT imaging demonstrated 5 mm stone within ureter.  No evidence of secondary infection on UA.  He had a lactic acidosis on presentation suspect in the setting of nausea vomiting.  He was given analgesia to good effect as well as IV fluids.  On repeat lactate this had normalized.  No leukocytosis no fever to suggest superimposed infection.  On reassessment he is pain controlled.  Given this, reasonable for trial of medical as well as therapy with Flomax, multimodal analgesia and follow-up with urology as an outpatient.  Discussed plan with patient he is agreeable. Return precautions instructed.        ED Course as of 09/08/24 0622   Sat Sep 07, 2024   0808 CT abdomen pelvis demonstrates right-sided stone within distal UPJ on my interpretation [DW]      ED Course User Index  [DW] Rafiq Teague MD              Consultants:      Shared Decision Making:  After my consideration of clinical presentation and any laboratory/radiology studies obtained, I discussed the findings with the patient/patient representative who is in agreement with the treatment plan and the final disposition.   Risks and benefits of discharge and/or observation/admission were discussed.         AS OF 06:22 EDT VITALS:    BP - 122/68  HR - 82  TEMP - 97.3 °F (36.3 °C) (Oral)  O2 SATS - 95%                  DIAGNOSIS  Final diagnoses:   Kidney stone   Ureterolithiasis         DISPOSITION  ED Disposition       ED Disposition   Discharge    Condition   Stable    Comment   --                   Please note that portions of this document were completed with voice recognition software.        Rafiq Teague MD  09/08/24 0622

## 2024-09-07 NOTE — DISCHARGE INSTRUCTIONS
Alternate ibuprofen and Tylenol for symptom control.  Take Zofran as needed for nausea.  For breakthrough pain you may take oxycodone.  No driving or operating heavy machinery while take this medication.  Do not combine with other sedating medications or alcohol.  Take Flomax at night to help with stone passage.  Follow-up with urology as instructed.

## 2024-12-15 ENCOUNTER — HOSPITAL ENCOUNTER (OUTPATIENT)
Facility: HOSPITAL | Age: 54
Discharge: HOME | End: 2024-12-17
Attending: STUDENT IN AN ORGANIZED HEALTH CARE EDUCATION/TRAINING PROGRAM | Admitting: INTERNAL MEDICINE
Payer: MEDICARE

## 2024-12-15 ENCOUNTER — APPOINTMENT (OUTPATIENT)
Dept: GENERAL RADIOLOGY | Facility: HOSPITAL | Age: 54
End: 2024-12-15
Payer: MEDICARE

## 2024-12-15 ENCOUNTER — APPOINTMENT (OUTPATIENT)
Dept: CT IMAGING | Facility: HOSPITAL | Age: 54
End: 2024-12-15
Payer: MEDICARE

## 2024-12-15 DIAGNOSIS — R07.9 CHEST PAIN, UNSPECIFIED TYPE: ICD-10-CM

## 2024-12-15 DIAGNOSIS — R07.2 PRECORDIAL PAIN: Primary | ICD-10-CM

## 2024-12-15 LAB
ALBUMIN SERPL-MCNC: 4.3 G/DL (ref 3.5–5.2)
ALBUMIN/GLOB SERPL: 1.4 G/DL
ALP SERPL-CCNC: 86 U/L (ref 39–117)
ALT SERPL W P-5'-P-CCNC: 64 U/L (ref 1–41)
ANION GAP SERPL CALCULATED.3IONS-SCNC: 12.9 MMOL/L (ref 5–15)
AST SERPL-CCNC: 39 U/L (ref 1–40)
BASOPHILS # BLD AUTO: 0.08 10*3/MM3 (ref 0–0.2)
BASOPHILS NFR BLD AUTO: 1.1 % (ref 0–1.5)
BILIRUB SERPL-MCNC: 0.3 MG/DL (ref 0–1.2)
BUN SERPL-MCNC: 12 MG/DL (ref 6–20)
BUN/CREAT SERPL: 10.1 (ref 7–25)
CALCIUM SPEC-SCNC: 9.3 MG/DL (ref 8.6–10.5)
CHLORIDE SERPL-SCNC: 102 MMOL/L (ref 98–107)
CO2 SERPL-SCNC: 26.1 MMOL/L (ref 22–29)
CREAT SERPL-MCNC: 1.19 MG/DL (ref 0.76–1.27)
DEPRECATED RDW RBC AUTO: 43.1 FL (ref 37–54)
EGFRCR SERPLBLD CKD-EPI 2021: 72.6 ML/MIN/1.73
EOSINOPHIL # BLD AUTO: 0.13 10*3/MM3 (ref 0–0.4)
EOSINOPHIL NFR BLD AUTO: 1.7 % (ref 0.3–6.2)
ERYTHROCYTE [DISTWIDTH] IN BLOOD BY AUTOMATED COUNT: 13.1 % (ref 12.3–15.4)
GEN 5 1HR TROPONIN T REFLEX: <6 NG/L
GLOBULIN UR ELPH-MCNC: 3 GM/DL
GLUCOSE SERPL-MCNC: 132 MG/DL (ref 65–99)
HCT VFR BLD AUTO: 44.3 % (ref 37.5–51)
HGB BLD-MCNC: 15.1 G/DL (ref 13–17.7)
HOLD SPECIMEN: NORMAL
HOLD SPECIMEN: NORMAL
IMM GRANULOCYTES # BLD AUTO: 0.01 10*3/MM3 (ref 0–0.05)
IMM GRANULOCYTES NFR BLD AUTO: 0.1 % (ref 0–0.5)
LIPASE SERPL-CCNC: 21 U/L (ref 13–60)
LYMPHOCYTES # BLD AUTO: 3.1 10*3/MM3 (ref 0.7–3.1)
LYMPHOCYTES NFR BLD AUTO: 40.8 % (ref 19.6–45.3)
MCH RBC QN AUTO: 30.8 PG (ref 26.6–33)
MCHC RBC AUTO-ENTMCNC: 34.1 G/DL (ref 31.5–35.7)
MCV RBC AUTO: 90.4 FL (ref 79–97)
MONOCYTES # BLD AUTO: 0.75 10*3/MM3 (ref 0.1–0.9)
MONOCYTES NFR BLD AUTO: 9.9 % (ref 5–12)
NEUTROPHILS NFR BLD AUTO: 3.53 10*3/MM3 (ref 1.7–7)
NEUTROPHILS NFR BLD AUTO: 46.4 % (ref 42.7–76)
NRBC BLD AUTO-RTO: 0 /100 WBC (ref 0–0.2)
PLATELET # BLD AUTO: 258 10*3/MM3 (ref 140–450)
PMV BLD AUTO: 9.9 FL (ref 6–12)
POTASSIUM SERPL-SCNC: 4.4 MMOL/L (ref 3.5–5.2)
PROT SERPL-MCNC: 7.3 G/DL (ref 6–8.5)
RBC # BLD AUTO: 4.9 10*6/MM3 (ref 4.14–5.8)
SODIUM SERPL-SCNC: 141 MMOL/L (ref 136–145)
TROPONIN T NUMERIC DELTA: NORMAL
TROPONIN T SERPL HS-MCNC: <6 NG/L
WBC NRBC COR # BLD AUTO: 7.6 10*3/MM3 (ref 3.4–10.8)
WHOLE BLOOD HOLD COAG: NORMAL
WHOLE BLOOD HOLD SPECIMEN: NORMAL

## 2024-12-15 PROCEDURE — 71275 CT ANGIOGRAPHY CHEST: CPT

## 2024-12-15 PROCEDURE — G0378 HOSPITAL OBSERVATION PER HR: HCPCS

## 2024-12-15 PROCEDURE — 84484 ASSAY OF TROPONIN QUANT: CPT | Performed by: STUDENT IN AN ORGANIZED HEALTH CARE EDUCATION/TRAINING PROGRAM

## 2024-12-15 PROCEDURE — 80053 COMPREHEN METABOLIC PANEL: CPT | Performed by: STUDENT IN AN ORGANIZED HEALTH CARE EDUCATION/TRAINING PROGRAM

## 2024-12-15 PROCEDURE — 93005 ELECTROCARDIOGRAM TRACING: CPT | Performed by: STUDENT IN AN ORGANIZED HEALTH CARE EDUCATION/TRAINING PROGRAM

## 2024-12-15 PROCEDURE — 25010000002 KETOROLAC TROMETHAMINE PER 15 MG: Performed by: NURSE PRACTITIONER

## 2024-12-15 PROCEDURE — 25010000002 ENOXAPARIN PER 10 MG: Performed by: INTERNAL MEDICINE

## 2024-12-15 PROCEDURE — 25510000001 IOPAMIDOL 61 % SOLUTION: Performed by: EMERGENCY MEDICINE

## 2024-12-15 PROCEDURE — 74177 CT ABD & PELVIS W/CONTRAST: CPT

## 2024-12-15 PROCEDURE — 25010000002 DIPHENHYDRAMINE PER 50 MG: Performed by: EMERGENCY MEDICINE

## 2024-12-15 PROCEDURE — 99222 1ST HOSP IP/OBS MODERATE 55: CPT | Performed by: INTERNAL MEDICINE

## 2024-12-15 PROCEDURE — 85025 COMPLETE CBC W/AUTO DIFF WBC: CPT | Performed by: STUDENT IN AN ORGANIZED HEALTH CARE EDUCATION/TRAINING PROGRAM

## 2024-12-15 PROCEDURE — 96372 THER/PROPH/DIAG INJ SC/IM: CPT

## 2024-12-15 PROCEDURE — 36415 COLL VENOUS BLD VENIPUNCTURE: CPT

## 2024-12-15 PROCEDURE — 96374 THER/PROPH/DIAG INJ IV PUSH: CPT

## 2024-12-15 PROCEDURE — 99285 EMERGENCY DEPT VISIT HI MDM: CPT | Performed by: STUDENT IN AN ORGANIZED HEALTH CARE EDUCATION/TRAINING PROGRAM

## 2024-12-15 PROCEDURE — 83690 ASSAY OF LIPASE: CPT | Performed by: NURSE PRACTITIONER

## 2024-12-15 PROCEDURE — 96375 TX/PRO/DX INJ NEW DRUG ADDON: CPT

## 2024-12-15 PROCEDURE — 71045 X-RAY EXAM CHEST 1 VIEW: CPT

## 2024-12-15 RX ORDER — ENOXAPARIN SODIUM 100 MG/ML
1 INJECTION SUBCUTANEOUS EVERY 12 HOURS
Status: DISCONTINUED | OUTPATIENT
Start: 2024-12-15 | End: 2024-12-16

## 2024-12-15 RX ORDER — LOSARTAN POTASSIUM 25 MG/1
50 TABLET ORAL DAILY
Status: DISCONTINUED | OUTPATIENT
Start: 2024-12-16 | End: 2024-12-17 | Stop reason: HOSPADM

## 2024-12-15 RX ORDER — IOPAMIDOL 612 MG/ML
100 INJECTION, SOLUTION INTRAVASCULAR
Status: COMPLETED | OUTPATIENT
Start: 2024-12-15 | End: 2024-12-15

## 2024-12-15 RX ORDER — NITROGLYCERIN 0.4 MG/1
0.4 TABLET SUBLINGUAL
Status: DISCONTINUED | OUTPATIENT
Start: 2024-12-15 | End: 2024-12-15

## 2024-12-15 RX ORDER — SODIUM CHLORIDE 0.9 % (FLUSH) 0.9 %
10 SYRINGE (ML) INJECTION AS NEEDED
Status: DISCONTINUED | OUTPATIENT
Start: 2024-12-15 | End: 2024-12-17 | Stop reason: HOSPADM

## 2024-12-15 RX ORDER — DIPHENHYDRAMINE HYDROCHLORIDE 50 MG/ML
25 INJECTION INTRAMUSCULAR; INTRAVENOUS ONCE
Status: COMPLETED | OUTPATIENT
Start: 2024-12-15 | End: 2024-12-15

## 2024-12-15 RX ORDER — ATORVASTATIN CALCIUM 80 MG/1
80 TABLET, FILM COATED ORAL NIGHTLY
Status: DISCONTINUED | OUTPATIENT
Start: 2024-12-15 | End: 2024-12-17 | Stop reason: HOSPADM

## 2024-12-15 RX ORDER — KETOROLAC TROMETHAMINE 30 MG/ML
30 INJECTION, SOLUTION INTRAMUSCULAR; INTRAVENOUS ONCE
Status: COMPLETED | OUTPATIENT
Start: 2024-12-15 | End: 2024-12-15

## 2024-12-15 RX ORDER — ACETAMINOPHEN 325 MG/1
975 TABLET ORAL ONCE
Status: COMPLETED | OUTPATIENT
Start: 2024-12-15 | End: 2024-12-15

## 2024-12-15 RX ORDER — ASPIRIN 325 MG
325 TABLET ORAL ONCE
Status: COMPLETED | OUTPATIENT
Start: 2024-12-15 | End: 2024-12-15

## 2024-12-15 RX ADMIN — ASPIRIN 325 MG: 325 TABLET ORAL at 16:28

## 2024-12-15 RX ADMIN — IOPAMIDOL 100 ML: 612 INJECTION, SOLUTION INTRAVENOUS at 18:40

## 2024-12-15 RX ADMIN — ATORVASTATIN CALCIUM 80 MG: 40 TABLET, FILM COATED ORAL at 23:37

## 2024-12-15 RX ADMIN — ACETAMINOPHEN 975 MG: 325 TABLET, FILM COATED ORAL at 16:50

## 2024-12-15 RX ADMIN — ENOXAPARIN SODIUM 110 MG: 100 INJECTION SUBCUTANEOUS at 22:34

## 2024-12-15 RX ADMIN — KETOROLAC TROMETHAMINE 30 MG: 30 INJECTION, SOLUTION INTRAMUSCULAR; INTRAVENOUS at 16:50

## 2024-12-15 RX ADMIN — DIPHENHYDRAMINE HYDROCHLORIDE 25 MG: 50 INJECTION, SOLUTION INTRAMUSCULAR; INTRAVENOUS at 19:33

## 2024-12-15 NOTE — Clinical Note
Hemostasis started on the right radial artery. R-Band was used in achieving hemostasis. Radial compression device applied to vessel. Hemostasis achieved successfully. Closure device additional comment: 10 mL 4664

## 2024-12-15 NOTE — ED PROVIDER NOTES
"Subjective:  History of Present Illness:    Patient is a 54-year-old male with history of pancreatitis and CVA.  He presents to the ER today with left-sided anterior chest pain that radiates into right side of neck.  Reports some lightheadedness and shortness of breath.  Reports that symptoms started approximately 20 minutes PTA.  Denies abdominal pain.  Denies changes in bowel or bladder habit.  Denies OTC medication or home remedy.  Denies alleviating or exacerbating factors.    Nurses Notes reviewed and agree, including vitals, allergies, social history and prior medical history.     REVIEW OF SYSTEMS: All systems reviewed and not pertinent unless noted.  Review of Systems   Cardiovascular:  Positive for chest pain.   All other systems reviewed and are negative.      Past Medical History:   Diagnosis Date    Arthritis     GERD (gastroesophageal reflux disease)     Kidney stones     Pancreatitis 04/2016    Stroke        Allergies:    Morphine and codeine, Oxycontin [oxycodone hcl], and Percocet [oxycodone-acetaminophen]      Past Surgical History:   Procedure Laterality Date    KNEE ARTHROSCOPY Bilateral     RECONSTRUCTION OF NOSE      REPLACEMENT TOTAL KNEE BILATERAL      rt x 2    SHOULDER ARTHROSCOPY Bilateral     x 3    TEMPOROMANDIBULAR JOINT SURGERY      TONSILLECTOMY AND ADENOIDECTOMY      VASECTOMY           Social History     Socioeconomic History    Marital status:      Spouse name: Cherelle   Tobacco Use    Smoking status: Never    Smokeless tobacco: Former     Quit date: 2006   Vaping Use    Vaping status: Never Used   Substance and Sexual Activity    Alcohol use: No    Drug use: No    Sexual activity: Defer         History reviewed. No pertinent family history.    Objective  Physical Exam:  /79 (BP Location: Left arm, Patient Position: Lying)   Pulse 58   Temp 98.2 °F (36.8 °C) (Oral)   Resp 16   Ht 175.3 cm (69\")   Wt 109 kg (239 lb 3.2 oz)   SpO2 94%   BMI 35.32 kg/m²  "     Physical Exam  Vitals and nursing note reviewed.   Constitutional:       Appearance: He is well-developed and normal weight.   HENT:      Head: Normocephalic and atraumatic.   Eyes:      Extraocular Movements: Extraocular movements intact.      Pupils: Pupils are equal, round, and reactive to light.   Cardiovascular:      Rate and Rhythm: Normal rate and regular rhythm.      Heart sounds: Normal heart sounds.   Pulmonary:      Effort: Pulmonary effort is normal.      Breath sounds: Normal breath sounds.   Musculoskeletal:         General: Normal range of motion.      Cervical back: Normal range of motion and neck supple.   Skin:     General: Skin is warm.      Capillary Refill: Capillary refill takes less than 2 seconds.   Neurological:      General: No focal deficit present.      Mental Status: He is alert and oriented to person, place, and time.   Psychiatric:         Mood and Affect: Mood normal.         Behavior: Behavior normal.         Procedures    ED Course:    ED Course as of 12/17/24 1020   Sun Dec 15, 2024   1637   EKG Interpretation    Evaluated and interpreted by emergency department physician    Rhythm: Normal Sinus Rhythm  Rate: Normal  Axis: Fabiola  Ectopy: none  Conduction: Normal  ST Segments: Normal  T Waves: T wave flattening in lead III  Q Waves: Nonspecific Q waves in aVR    Clinical Impression: Normal Sinus Rhythm  V2 is missing.  Maciej Shah DO   [CR]      ED Course User Index  [CR] Maciej Shah DO       Lab Results (last 24 hours)       ** No results found for the last 24 hours. **             Cardiac Catheterization/Vascular Study    Result Date: 12/17/2024  Angiographically minor coronary atherosclerosis     CT Angiogram Chest Pulmonary Embolism    Result Date: 12/15/2024  FINAL REPORT TECHNIQUE: null CLINICAL HISTORY: Rule out PE COMPARISON: null FINDINGS: CT angiogram of chest with MIP postprocessing: Comparison: None FINDINGS: Pulmonary arteries: Pulmonary  arteries are well-opacified, free of intraluminal thrombus. Thoracic aorta: Normal caliber without dissection. Mediastinum/heart: Heart size is normal. Normal RV/LV ratio. No pericardial effusion. No enlarged mediastinal, hilar, or axillary lymph nodes. Trachea and mainstem bronchi of normal caliber without intraluminal lesion. Visualized thyroid gland without mass or enlargement. Esophagus without significant distention or evidence of mass. Lungs/pleura: Lungs are well expanded, free of infiltrates, soft tissue nodule, or chronic interstitial change. No pleural effusion or pneumothorax. Uppermost abdomen: No adrenal mass. Contracted gallbladder without radiopaque/radiolucent gallstones. Bone/MSK: Moderate spondylosis of the thoracic spine. No acute fracture involving the visualized bony thorax. No destructive osseous lesion.     Impression: IMPRESSION: 1. Negative for acute pulmonary artery embolism. Thoracic aorta of normal caliber without dissection. 2. No consolidation, pleural effusion, or pneumothorax. 3. Other findings above. Authenticated and Electronically Signed by Jack Lane MD on 12/15/2024 07:57:09 PM    CT Abdomen Pelvis With Contrast    Result Date: 12/15/2024  FINAL REPORT TECHNIQUE: null CLINICAL HISTORY: Rule out dissection COMPARISON: null FINDINGS: CT abdomen and pelvis with IV contrast. Comparison: None FINDINGS: Lung bases: No soft tissue nodule, infiltrate, or pleural effusion. Upper Abdomen: The liver, spleen, and pancreas are within normal limits. Gallbladder is contracted without intraluminal radiopaque/radiolucent calculus. No biliary dilatation. Retroperitoneum/Pelvis: No adrenal mass. Abdominal aorta is of normal caliber without significant calcific atheromatous change. No abdominal aortic dissection. Retroaortic left renal vein without significant stenosis. No pathologic para-aortic adenopathy. Normal-sized kidneys. Hyperdense renal pyramids bilaterally may reflect  microcalcifications. No renal mass, hydronephrosis or nephrolithiasis. Nondilated ureters without calculus. Normal-appearing urinary bladder. Bowel/Mesentery: No colonic obstruction or inflammation, or diverticular disease. Normal appendix positioned lateral to the inferior margin of the right lobe of the liver. Small bowel without dilatation or mural thickening. Midabdomen mesenteric edema and mild adenopathy. Mildly distended stomach without focal lesion. The superior mesenteric artery and vein are patent. Bone/Extraperitoneal Soft Tissues: Mild spondylosis and scoliosis of the lumbar spine. Unremarkable bony pelvis. Extraperitoneal soft tissues of the abdomen and pelvis are unremarkable.     Impression: IMPRESSION: 1. Normal caliber abdominal aorta without dissection. Patent mesenteric branches. 2. Midabdomen mesenteric edema and adenopathy may reflect mesenteric panniculitis. 3. Other findings above. Authenticated and Electronically Signed by Jack Lane MD on 12/15/2024 07:40:48 PM    XR Chest 1 View    Result Date: 12/15/2024  PROCEDURE: XR CHEST 1 VW-  HISTORY: Chest Pain Triage Protocol  COMPARISON: None.  FINDINGS: The heart is normal in size. The lungs are clear. The mediastinum is unremarkable. There is no pneumothorax.  There are no acute osseous abnormalities. Patient is rotated to the right. Decreased inspiratory effort noted.      Impression: No acute cardiopulmonary process.      This report was signed and finalized on 12/15/2024 4:47 PM by Vivian Morgan MD.          [facility]      Initial impression of presenting illness: Patient is a 54-year-old male with history of pancreatitis and CVA.  He presents to the ER today with left-sided anterior chest pain that radiates into right side of neck.  Reports some lightheadedness and shortness of breath.  Reports that symptoms started approximately 20 minutes PTA.  Denies abdominal pain.  Denies changes in bowel or bladder habit.  Denies OTC medication or home  remedy.  Denies alleviating or exacerbating factors.    DDX: includes but is not limited to: Myocardial infarction, pancreatitis, strain, sprain, lung infection or other    Patient arrives stable with vitals interpreted by myself.     Pertinent features from physical exam: Lung sounds are clear bilaterally throughout.  Ab soft nontender.  Bowel sounds normal.  Heart sounds normal..    Initial diagnostic plan: CBC, CMP, troponin, lipase, EKG, chest x-ray, CT angio PE protocol, CT abdomen pelvis with contrast    Results from initial plan were reviewed and interpreted by me revealing CBC is within normal parameter.  CMP is within appropriate range.  Troponin initial is negative.  EKG normal sinus rhythm rate of 68 bpm.  Chest x-ray with the following impression no acute cardiopulmonary process.  Patient with negative CT pulmonary embolism study and negative CT abdomen.    Diagnostic information from other sources: Chart review    Interventions / Re-evaluation: Vital signs stable throughout encounter    Results/clinical rationale were discussed with patient    Consultations/Discussion of results with other physicians: Spoke with Dr. Herrmann.  Given that patient is still having neck and shoulder pain.  His recommendation is to bring patient into the hospital.    Disposition plan: Patient mated to hospitalist group for further management and care of chest pain.        Final diagnoses:   Chest pain, unspecified type          Maciej Shah, DO  12/15/24 [id number]       Maciej Shah, DO  12/17/24 1020

## 2024-12-15 NOTE — Clinical Note
A 6 fr sheath was successfully inserted with ultrasound guidance into the right radial artery. Sheath insertion delayed.

## 2024-12-16 ENCOUNTER — APPOINTMENT (OUTPATIENT)
Dept: NUCLEAR MEDICINE | Facility: HOSPITAL | Age: 54
End: 2024-12-16
Payer: MEDICARE

## 2024-12-16 LAB
ANION GAP SERPL CALCULATED.3IONS-SCNC: 11.3 MMOL/L (ref 5–15)
BASOPHILS # BLD AUTO: 0.06 10*3/MM3 (ref 0–0.2)
BASOPHILS NFR BLD AUTO: 1.1 % (ref 0–1.5)
BUN SERPL-MCNC: 14 MG/DL (ref 6–20)
BUN/CREAT SERPL: 13 (ref 7–25)
CALCIUM SPEC-SCNC: 8.9 MG/DL (ref 8.6–10.5)
CHLORIDE SERPL-SCNC: 104 MMOL/L (ref 98–107)
CHOLEST SERPL-MCNC: 147 MG/DL (ref 0–200)
CO2 SERPL-SCNC: 24.7 MMOL/L (ref 22–29)
CREAT SERPL-MCNC: 1.08 MG/DL (ref 0.76–1.27)
DEPRECATED RDW RBC AUTO: 43.7 FL (ref 37–54)
EGFRCR SERPLBLD CKD-EPI 2021: 81.5 ML/MIN/1.73
EOSINOPHIL # BLD AUTO: 0.17 10*3/MM3 (ref 0–0.4)
EOSINOPHIL NFR BLD AUTO: 3.2 % (ref 0.3–6.2)
ERYTHROCYTE [DISTWIDTH] IN BLOOD BY AUTOMATED COUNT: 13.2 % (ref 12.3–15.4)
GLUCOSE SERPL-MCNC: 108 MG/DL (ref 65–99)
HBA1C MFR BLD: 6.7 % (ref 4.8–5.6)
HCT VFR BLD AUTO: 43.8 % (ref 37.5–51)
HDLC SERPL-MCNC: 49 MG/DL (ref 40–60)
HGB BLD-MCNC: 14.7 G/DL (ref 13–17.7)
IMM GRANULOCYTES # BLD AUTO: 0.01 10*3/MM3 (ref 0–0.05)
IMM GRANULOCYTES NFR BLD AUTO: 0.2 % (ref 0–0.5)
LDLC SERPL CALC-MCNC: 79 MG/DL (ref 0–100)
LDLC/HDLC SERPL: 1.58 {RATIO}
LYMPHOCYTES # BLD AUTO: 2.29 10*3/MM3 (ref 0.7–3.1)
LYMPHOCYTES NFR BLD AUTO: 42.5 % (ref 19.6–45.3)
MCH RBC QN AUTO: 30.4 PG (ref 26.6–33)
MCHC RBC AUTO-ENTMCNC: 33.6 G/DL (ref 31.5–35.7)
MCV RBC AUTO: 90.7 FL (ref 79–97)
MONOCYTES # BLD AUTO: 0.54 10*3/MM3 (ref 0.1–0.9)
MONOCYTES NFR BLD AUTO: 10 % (ref 5–12)
NEUTROPHILS NFR BLD AUTO: 2.32 10*3/MM3 (ref 1.7–7)
NEUTROPHILS NFR BLD AUTO: 43 % (ref 42.7–76)
NRBC BLD AUTO-RTO: 0 /100 WBC (ref 0–0.2)
PLATELET # BLD AUTO: 238 10*3/MM3 (ref 140–450)
PMV BLD AUTO: 9.8 FL (ref 6–12)
POTASSIUM SERPL-SCNC: 4.1 MMOL/L (ref 3.5–5.2)
RBC # BLD AUTO: 4.83 10*6/MM3 (ref 4.14–5.8)
SODIUM SERPL-SCNC: 140 MMOL/L (ref 136–145)
TRIGL SERPL-MCNC: 102 MG/DL (ref 0–150)
VLDLC SERPL-MCNC: 19 MG/DL (ref 5–40)
WBC NRBC COR # BLD AUTO: 5.39 10*3/MM3 (ref 3.4–10.8)

## 2024-12-16 PROCEDURE — 80061 LIPID PANEL: CPT | Performed by: INTERNAL MEDICINE

## 2024-12-16 PROCEDURE — G0378 HOSPITAL OBSERVATION PER HR: HCPCS

## 2024-12-16 PROCEDURE — 93017 CV STRESS TEST TRACING ONLY: CPT

## 2024-12-16 PROCEDURE — 85025 COMPLETE CBC W/AUTO DIFF WBC: CPT | Performed by: INTERNAL MEDICINE

## 2024-12-16 PROCEDURE — 96372 THER/PROPH/DIAG INJ SC/IM: CPT

## 2024-12-16 PROCEDURE — 25010000002 ENOXAPARIN PER 10 MG: Performed by: STUDENT IN AN ORGANIZED HEALTH CARE EDUCATION/TRAINING PROGRAM

## 2024-12-16 PROCEDURE — A9500 TC99M SESTAMIBI: HCPCS | Performed by: STUDENT IN AN ORGANIZED HEALTH CARE EDUCATION/TRAINING PROGRAM

## 2024-12-16 PROCEDURE — 80048 BASIC METABOLIC PNL TOTAL CA: CPT | Performed by: INTERNAL MEDICINE

## 2024-12-16 PROCEDURE — 34310000005 TECHNETIUM SESTAMIBI: Performed by: STUDENT IN AN ORGANIZED HEALTH CARE EDUCATION/TRAINING PROGRAM

## 2024-12-16 PROCEDURE — 78452 HT MUSCLE IMAGE SPECT MULT: CPT

## 2024-12-16 PROCEDURE — 83036 HEMOGLOBIN GLYCOSYLATED A1C: CPT | Performed by: INTERNAL MEDICINE

## 2024-12-16 RX ORDER — SODIUM CHLORIDE 0.9 % (FLUSH) 0.9 %
10 SYRINGE (ML) INJECTION EVERY 12 HOURS SCHEDULED
Status: DISCONTINUED | OUTPATIENT
Start: 2024-12-16 | End: 2024-12-17 | Stop reason: HOSPADM

## 2024-12-16 RX ORDER — AMOXICILLIN 250 MG
2 CAPSULE ORAL 2 TIMES DAILY PRN
Status: DISCONTINUED | OUTPATIENT
Start: 2024-12-16 | End: 2024-12-17 | Stop reason: HOSPADM

## 2024-12-16 RX ORDER — PANTOPRAZOLE SODIUM 40 MG/1
40 TABLET, DELAYED RELEASE ORAL
Status: DISCONTINUED | OUTPATIENT
Start: 2024-12-16 | End: 2024-12-17 | Stop reason: HOSPADM

## 2024-12-16 RX ORDER — CYCLOBENZAPRINE HCL 10 MG
10 TABLET ORAL 2 TIMES DAILY PRN
Status: DISCONTINUED | OUTPATIENT
Start: 2024-12-16 | End: 2024-12-17 | Stop reason: HOSPADM

## 2024-12-16 RX ORDER — ENOXAPARIN SODIUM 150 MG/ML
1 INJECTION SUBCUTANEOUS EVERY 12 HOURS SCHEDULED
Status: DISCONTINUED | OUTPATIENT
Start: 2024-12-16 | End: 2024-12-17

## 2024-12-16 RX ORDER — ACETAMINOPHEN 325 MG/1
650 TABLET ORAL EVERY 4 HOURS PRN
Status: DISCONTINUED | OUTPATIENT
Start: 2024-12-16 | End: 2024-12-17 | Stop reason: HOSPADM

## 2024-12-16 RX ORDER — ONDANSETRON 2 MG/ML
4 INJECTION INTRAMUSCULAR; INTRAVENOUS EVERY 6 HOURS PRN
Status: DISCONTINUED | OUTPATIENT
Start: 2024-12-16 | End: 2024-12-17 | Stop reason: HOSPADM

## 2024-12-16 RX ORDER — BISACODYL 5 MG/1
5 TABLET, DELAYED RELEASE ORAL DAILY PRN
Status: DISCONTINUED | OUTPATIENT
Start: 2024-12-16 | End: 2024-12-17 | Stop reason: HOSPADM

## 2024-12-16 RX ORDER — ASPIRIN 81 MG/1
81 TABLET ORAL DAILY
Status: DISCONTINUED | OUTPATIENT
Start: 2024-12-16 | End: 2024-12-17 | Stop reason: HOSPADM

## 2024-12-16 RX ORDER — SODIUM CHLORIDE 0.9 % (FLUSH) 0.9 %
10 SYRINGE (ML) INJECTION AS NEEDED
Status: DISCONTINUED | OUTPATIENT
Start: 2024-12-16 | End: 2024-12-17 | Stop reason: HOSPADM

## 2024-12-16 RX ORDER — ACETAMINOPHEN 650 MG/1
650 SUPPOSITORY RECTAL EVERY 4 HOURS PRN
Status: DISCONTINUED | OUTPATIENT
Start: 2024-12-16 | End: 2024-12-17 | Stop reason: HOSPADM

## 2024-12-16 RX ORDER — ACETAMINOPHEN 160 MG/5ML
650 SOLUTION ORAL EVERY 4 HOURS PRN
Status: DISCONTINUED | OUTPATIENT
Start: 2024-12-16 | End: 2024-12-17 | Stop reason: HOSPADM

## 2024-12-16 RX ORDER — ENOXAPARIN SODIUM 100 MG/ML
1 INJECTION SUBCUTANEOUS EVERY 12 HOURS
Status: DISCONTINUED | OUTPATIENT
Start: 2024-12-16 | End: 2024-12-16

## 2024-12-16 RX ORDER — NITROGLYCERIN 0.4 MG/1
0.4 TABLET SUBLINGUAL
Status: DISCONTINUED | OUTPATIENT
Start: 2024-12-16 | End: 2024-12-17

## 2024-12-16 RX ORDER — BISACODYL 10 MG
10 SUPPOSITORY, RECTAL RECTAL DAILY PRN
Status: DISCONTINUED | OUTPATIENT
Start: 2024-12-16 | End: 2024-12-17 | Stop reason: HOSPADM

## 2024-12-16 RX ORDER — SODIUM CHLORIDE 9 MG/ML
40 INJECTION, SOLUTION INTRAVENOUS AS NEEDED
Status: DISCONTINUED | OUTPATIENT
Start: 2024-12-16 | End: 2024-12-17 | Stop reason: HOSPADM

## 2024-12-16 RX ORDER — POLYETHYLENE GLYCOL 3350 17 G/17G
17 POWDER, FOR SOLUTION ORAL DAILY PRN
Status: DISCONTINUED | OUTPATIENT
Start: 2024-12-16 | End: 2024-12-17 | Stop reason: HOSPADM

## 2024-12-16 RX ORDER — TAMSULOSIN HYDROCHLORIDE 0.4 MG/1
0.4 CAPSULE ORAL NIGHTLY
Status: DISCONTINUED | OUTPATIENT
Start: 2024-12-16 | End: 2024-12-17 | Stop reason: HOSPADM

## 2024-12-16 RX ADMIN — ACETAMINOPHEN 650 MG: 325 TABLET, FILM COATED ORAL at 12:41

## 2024-12-16 RX ADMIN — ASPIRIN 81 MG: 81 TABLET, COATED ORAL at 08:00

## 2024-12-16 RX ADMIN — ACETAMINOPHEN 650 MG: 325 TABLET, FILM COATED ORAL at 08:00

## 2024-12-16 RX ADMIN — NITROGLYCERIN 0.4 MG: 0.4 TABLET SUBLINGUAL at 06:20

## 2024-12-16 RX ADMIN — ENOXAPARIN SODIUM 110 MG: 100 INJECTION SUBCUTANEOUS at 08:00

## 2024-12-16 RX ADMIN — PANTOPRAZOLE SODIUM 40 MG: 40 TABLET, DELAYED RELEASE ORAL at 07:18

## 2024-12-16 RX ADMIN — CYCLOBENZAPRINE 10 MG: 10 TABLET, FILM COATED ORAL at 17:21

## 2024-12-16 RX ADMIN — ENOXAPARIN SODIUM 105 MG: 150 INJECTION SUBCUTANEOUS at 21:07

## 2024-12-16 RX ADMIN — ATORVASTATIN CALCIUM 80 MG: 40 TABLET, FILM COATED ORAL at 21:16

## 2024-12-16 RX ADMIN — Medication 10 ML: at 08:01

## 2024-12-16 RX ADMIN — NITROGLYCERIN 0.4 MG: 0.4 TABLET SUBLINGUAL at 12:41

## 2024-12-16 RX ADMIN — LOSARTAN POTASSIUM 50 MG: 25 TABLET, FILM COATED ORAL at 08:00

## 2024-12-16 RX ADMIN — Medication 10 ML: at 21:16

## 2024-12-16 RX ADMIN — TECHNETIUM TC 99M SESTAMIBI 1 DOSE: 1 INJECTION INTRAVENOUS at 09:00

## 2024-12-16 RX ADMIN — ACETAMINOPHEN 650 MG: 325 TABLET, FILM COATED ORAL at 17:21

## 2024-12-16 NOTE — ED NOTES
"ED tech came to this RN and stated pt. [place] like he was having a reaction to CT contrast. RN at bedside ATT. Pt. States his ears and burning and he is having \"heart burn.\" Pt. Denies SOA. MD notified  "

## 2024-12-16 NOTE — PAYOR COMM NOTE
"TO:HUMANA  FROM:JASSON LOYA RN PHONE [phone]  OBSERVATION NOTIFICATION  TAX ID [id number] [facility] [phone]    Aleksandar Rodriguez (54 y.o. Male)       Date of Birth   1970    Social Security Number       Address   [address] [place] [address]    Home Phone   [phone]    MRN   [id number]       Islam   Anabaptism    Marital Status                               Admission Date   12/15/24    Admission Type   Emergency    Admitting Provider   Macho Arechiga DO    Attending Provider   Kerley, Brian Joseph, DO    Department, Room/Bed   [facility] TELEMETRY SDS OVERFLOW, T04       Discharge Date       Discharge Disposition       Discharge Destination                                 Attending Provider: Kerley, Brian Joseph, DO    Allergies: Morphine And Codeine, Oxycontin [Oxycodone Hcl], Percocet [Oxycodone-acetaminophen]    Isolation: None   Infection: None   Code Status: CPR    Ht: 175.3 cm (69\")   Wt: 109 kg (239 lb 3.2 oz)    Admission Cmt: None   Principal Problem: Chest pain [R07.9]                   Active Insurance as of 12/15/2024       Primary Coverage       Payor Plan Insurance Group Employer/Plan Group    HUMANA MEDICARE REPLACEMENT HUMANA MED ADV HMO [id number]       Payor Plan Address Payor Plan Phone Number Payor Plan Fax Number Effective Dates    [address] [address] [phone]  2023 - None Entered    [facility] [phone]         Subscriber Name Subscriber Birth Date Member ID       ALEKSANDAR RODRIGUEZ 1970 [id number]                     Emergency Contacts        (Rel.) Home Phone Work Phone Mobile Phone    Cherelle Rodriguez (Spouse) [phone] -- [phone]                 History & Physical        Macho Arechiga DO at 12/15/24 2115            [facility] HOSPITALIST   HISTORY AND PHYSICAL      Name:  Aleksandar Rodriguez   Age:  54 y.o.  Sex:  male  :  1970  MRN:  [id number]   Visit Number:  " [id number]  Admission Date:  12/15/2024  Date Of Service:  12/15/24  Primary Care Physician:  Juan Ramon Bianchi MD    Chief Complaint:     Chest pain    History Of Presenting Illness:      Patient is a 54-year-old male with history significant for kidney stones, pancreatitis, and migraine with strokelike symptoms who presents to the emergency room with acute onset left-sided chest pain.  Patient reports that pain radiated into the left side of neck and into the left shoulder.  Patient was sitting on the couch watching the [employer] football game when pain started.  Has never experienced this before.  Describes it as a pressure/throbbing sensation with radiation to left neck and left shoulder.  Chest pain has improved since arrival to the ER but pain is still present in left shoulder.  Patient denies associated shortness of breath, nausea or vomiting.  Denies recent illness.  Lifelong non-smoker.  Denies alcohol and illicit drug use.    ED summary: Patient afebrile, normotensive, nonhypoxic on room air.  Troponins negative x 2.  CMP largely unremarkable except for glucose 132.  CBC unremarkable.  Chest x-ray personally reviewed, no acute process.  CT of the chest negative for PE.  No consolidation effusion or pneumothorax.  CT abdomen pelvis shows no abdominal aorta dissection.  Mid abdomen mesenteric edema and adenopathy may reflect mesenteric panniculitis.  Patient with continued left-sided chest pain.  On-call cardiologist, Dr. Herrmann contacted by ER.  Recommended admission.  Patient received aspirin therapy and hospitalist asked to admit.    Review Of Systems:    All systems were reviewed and negative except as mentioned in history of presenting illness, assessment and plan.    Past Medical History: Patient  has a past medical history of Arthritis, GERD (gastroesophageal reflux disease), Kidney stones, Pancreatitis (04/2016), and Stroke.    Past Surgical History: Patient  has a past surgical history that  includes Shoulder arthroscopy (Bilateral); Knee arthroscopy (Bilateral); Replacement total knee bilateral; Vasectomy; Reconstruction of nose; Temporomandibular joint surgery; and Tonsillectomy and adenoidectomy.    Social History: Patient  reports that he has never smoked. He quit smokeless tobacco use about 18 years ago. He reports that he does not drink alcohol and does not use drugs.    Family History:  Patient's family history has been reviewed and found to be noncontributory.     Allergies:      Morphine and codeine, Oxycontin [oxycodone hcl], and Percocet [oxycodone-acetaminophen]    Home Medications:    Prior to Admission Medications       Prescriptions Last Dose Informant Patient Reported? Taking?    acetaminophen (TYLENOL) 500 MG tablet   No No    Take 1 tablet by mouth Every 6 (Six) Hours As Needed for Mild Pain.    aspirin 81 MG EC tablet   Yes No    Take 81 mg by mouth Daily.    atorvastatin (LIPITOR) 20 MG tablet   Yes No    Take 20 mg by mouth Daily.    cetirizine (zyrTEC) 10 MG tablet   Yes No    Take 10 mg by mouth Daily As Needed for Allergies.    cyclobenzaprine (FLEXERIL) 10 MG tablet   Yes No    Take 10 mg by mouth 2 (Two) Times a Day As Needed for Muscle Spasms.    ibuprofen (ADVIL,MOTRIN) 600 MG tablet   No No    Take 1 tablet by mouth Every 6 (Six) Hours As Needed for Mild Pain or Moderate Pain.    Multiple Vitamins-Minerals (MULTIVITAMIN ADULT PO)   Yes No    Take 1 tablet by mouth Daily.    omeprazole (priLOSEC) 20 MG capsule   Yes No    Take 20 mg by mouth Daily.    ondansetron ODT (ZOFRAN-ODT) 4 MG disintegrating tablet   No No    Take 1 tablet by mouth 4 (Four) Times a Day As Needed for Nausea or Vomiting.    oxyCODONE (Roxicodone) 5 MG immediate release tablet   No No    Take 1 tablet by mouth Every 4 (Four) Hours As Needed for Moderate Pain or Severe Pain.    tamsulosin (FLOMAX) 0.4 MG capsule 24 hr capsule   No No    Take 1 capsule by mouth Every Night.          ED  "Medications:    Medications   sodium chloride 0.9 % flush 10 mL (has no administration in time range)   losartan (COZAAR) tablet 50 mg (has no administration in time range)   atorvastatin (LIPITOR) tablet 80 mg (has no administration in time range)   Pharmacy to Dose enoxaparin (LOVENOX) (has no administration in time range)   aspirin tablet 325 mg (325 mg Oral Given 12/15/24 1628)   acetaminophen (TYLENOL) tablet 975 mg (975 mg Oral Given 12/15/24 1650)   ketorolac (TORADOL) injection 30 mg (30 mg Intravenous Given 12/15/24 1650)   iopamidol (ISOVUE-300) 61 % injection 100 mL (100 mL Intravenous Given 12/15/24 1840)   diphenhydrAMINE (BENADRYL) injection 25 mg (25 mg Intravenous Given 12/15/24 1933)     Vital Signs:  Temp:  [97.5 °F (36.4 °C)] 97.5 °F (36.4 °C)  Heart Rate:  [59-70] 59  Resp:  [13-20] 14  BP: (122-148)/(83-93) 143/92        12/15/24  1603   Weight: 107 kg (235 lb)     Body mass index is 34.7 kg/m².    Physical Exam:     Most recent vital Signs: /92   Pulse 59   Temp 97.5 °F (36.4 °C) (Oral)   Resp 14   Ht 175.3 cm (69\")   Wt 107 kg (235 lb)   SpO2 94%   BMI 34.70 kg/m²     Physical Exam  Vitals reviewed.   Constitutional:       General: He is not in acute distress.     Appearance: He is obese.   HENT:      Head: Normocephalic and atraumatic.      Right Ear: External ear normal.      Left Ear: External ear normal.      Mouth/Throat:      Mouth: Mucous membranes are moist.      Pharynx: Oropharynx is clear.   Eyes:      Extraocular Movements: Extraocular movements intact.      Conjunctiva/sclera: Conjunctivae normal.   Cardiovascular:      Rate and Rhythm: Normal rate and regular rhythm.      Pulses: Normal pulses.      Heart sounds: Normal heart sounds.   Pulmonary:      Effort: Pulmonary effort is normal.      Breath sounds: Normal breath sounds.   Abdominal:      General: Bowel sounds are normal.      Palpations: Abdomen is soft.   Musculoskeletal:         General: Normal range of " "motion.      Right lower leg: No edema.      Left lower leg: No edema.   Skin:     General: Skin is warm and dry.   Neurological:      General: No focal deficit present.      Mental Status: He is alert and oriented to person, place, and time.         Laboratory data:    I have reviewed the labs done in the emergency room.    Results from last 7 days   Lab Units 12/15/24  1651   SODIUM mmol/L 141   POTASSIUM mmol/L 4.4   CHLORIDE mmol/L 102   CO2 mmol/L 26.1   BUN mg/dL 12   CREATININE mg/dL 1.19   CALCIUM mg/dL 9.3   BILIRUBIN mg/dL 0.3   ALK PHOS U/L 86   ALT (SGPT) U/L 64*   AST (SGOT) U/L 39   GLUCOSE mg/dL 132*     Results from last 7 days   Lab Units 12/15/24  1651   WBC 10*3/mm3 7.60   HEMOGLOBIN g/dL 15.1   HEMATOCRIT % 44.3   PLATELETS 10*3/mm3 258         Results from last 7 days   Lab Units 12/15/24  1752 12/15/24  1651   HSTROP T ng/L <6 <6             Results from last 7 days   Lab Units 12/15/24  1752   LIPASE U/L 21               Invalid input(s): \"USDES\", \"NITRITITE\", \"BACT\", \"EP\"    Pain Management Panel           No data to display                EKG:      EKG, sinus rhythm with rate of 68 bpm.  Low voltage.    Radiology:    CT Angiogram Chest Pulmonary Embolism    Result Date: 12/15/2024  FINAL REPORT TECHNIQUE: null CLINICAL HISTORY: Rule out PE COMPARISON: null FINDINGS: CT angiogram of chest with MIP postprocessing: Comparison: None FINDINGS: Pulmonary arteries: Pulmonary arteries are well-opacified, free of intraluminal thrombus. Thoracic aorta: Normal caliber without dissection. Mediastinum/heart: Heart size is normal. Normal RV/LV ratio. No pericardial effusion. No enlarged mediastinal, hilar, or axillary lymph nodes. Trachea and mainstem bronchi of normal caliber without intraluminal lesion. Visualized thyroid gland without mass or enlargement. Esophagus without significant distention or evidence of mass. Lungs/pleura: Lungs are well expanded, free of infiltrates, soft tissue nodule, or " chronic interstitial change. No pleural effusion or pneumothorax. Uppermost abdomen: No adrenal mass. Contracted gallbladder without radiopaque/radiolucent gallstones. Bone/MSK: Moderate spondylosis of the thoracic spine. No acute fracture involving the visualized bony thorax. No destructive osseous lesion.     IMPRESSION: 1. Negative for acute pulmonary artery embolism. Thoracic aorta of normal caliber without dissection. 2. No consolidation, pleural effusion, or pneumothorax. 3. Other findings above. Authenticated and Electronically Signed by Jack Lane MD on 12/15/2024 07:57:09 PM    CT Abdomen Pelvis With Contrast    Result Date: 12/15/2024  FINAL REPORT TECHNIQUE: null CLINICAL HISTORY: Rule out dissection COMPARISON: null FINDINGS: CT abdomen and pelvis with IV contrast. Comparison: None FINDINGS: Lung bases: No soft tissue nodule, infiltrate, or pleural effusion. Upper Abdomen: The liver, spleen, and pancreas are within normal limits. Gallbladder is contracted without intraluminal radiopaque/radiolucent calculus. No biliary dilatation. Retroperitoneum/Pelvis: No adrenal mass. Abdominal aorta is of normal caliber without significant calcific atheromatous change. No abdominal aortic dissection. Retroaortic left renal vein without significant stenosis. No pathologic para-aortic adenopathy. Normal-sized kidneys. Hyperdense renal pyramids bilaterally may reflect microcalcifications. No renal mass, hydronephrosis or nephrolithiasis. Nondilated ureters without calculus. Normal-appearing urinary bladder. Bowel/Mesentery: No colonic obstruction or inflammation, or diverticular disease. Normal appendix positioned lateral to the inferior margin of the right lobe of the liver. Small bowel without dilatation or mural thickening. Midabdomen mesenteric edema and mild adenopathy. Mildly distended stomach without focal lesion. The superior mesenteric artery and vein are patent. Bone/Extraperitoneal Soft Tissues: Mild  spondylosis and scoliosis of the lumbar spine. Unremarkable bony pelvis. Extraperitoneal soft tissues of the abdomen and pelvis are unremarkable.     IMPRESSION: 1. Normal caliber abdominal aorta without dissection. Patent mesenteric branches. 2. Midabdomen mesenteric edema and adenopathy may reflect mesenteric panniculitis. 3. Other findings above. Authenticated and Electronically Signed by Jack Lane MD on 12/15/2024 07:40:48 PM    XR Chest 1 View    Result Date: 12/15/2024  PROCEDURE: XR CHEST 1 VW-  HISTORY: Chest Pain Triage Protocol  COMPARISON: None.  FINDINGS: The heart is normal in size. The lungs are clear. The mediastinum is unremarkable. There is no pneumothorax.  There are no acute osseous abnormalities. Patient is rotated to the right. Decreased inspiratory effort noted.      No acute cardiopulmonary process.      This report was signed and finalized on 12/15/2024 4:47 PM by Vivian Morgan MD.       Assessment:    Chest Pain  GERD   Hyperlipidemia    Plan:    Chest Pain  Patient with continued left-sided chest pain in the ER   Troponin negative x 2  EKG without acute ischemic changes  Dr. Herrmann with cardiology consulted  Therapeutic Lovenox  High intensity statin  Initiate losartan for better blood pressure control  Daily aspirin  A1c and lipid panel pending    Further orders as clinical course dictate      Risk Assessment: High  DVT Prophylaxis: Therapeutic Lovenox  Code Status: Full  Diet: Cardiac             Macho Arechiga DO  12/15/24  21:15 EST    Dictated utilizing Dragon dictation.      Electronically signed by Macho Arechiga DO at 12/15/24 2324          Emergency Department Notes        June Estrella RN at 12/15/24 2057          Report given to Noelle. Pt. Going to T04    Electronically signed by June Estrella RN at 12/15/24 2058       June Estrella RN at 12/15/24 1916          ED tech came to this RN and stated pt. [place] like he was having a reaction to CT contrast. RN  "at bedside ATT. Pt. States his ears and burning and he is having \"heart burn.\" Pt. Denies SOA. MD notified    Electronically signed by June Estrella RN at 12/15/24 1916       Maciej Shah DO at 12/15/24 1742          Subjective:  History of Present Illness:    Patient is a 54-year-old male with history of pancreatitis and CVA.  He presents to the ER today with left-sided anterior chest pain that radiates into right side of neck.  Reports some lightheadedness and shortness of breath.  Reports that symptoms started approximately 20 minutes PTA.  Denies abdominal pain.  Denies changes in bowel or bladder habit.  Denies OTC medication or home remedy.  Denies alleviating or exacerbating factors.    Nurses Notes reviewed and agree, including vitals, allergies, social history and prior medical history.     REVIEW OF SYSTEMS: All systems reviewed and not pertinent unless noted.  Review of Systems   Cardiovascular:  Positive for chest pain.   All other systems reviewed and are negative.      Past Medical History:   Diagnosis Date    Arthritis     GERD (gastroesophageal reflux disease)     Kidney stones     Pancreatitis 04/2016    Stroke        Allergies:    Morphine and codeine, Oxycontin [oxycodone hcl], and Percocet [oxycodone-acetaminophen]      Past Surgical History:   Procedure Laterality Date    KNEE ARTHROSCOPY Bilateral     RECONSTRUCTION OF NOSE      REPLACEMENT TOTAL KNEE BILATERAL      rt x 2    SHOULDER ARTHROSCOPY Bilateral     x 3    TEMPOROMANDIBULAR JOINT SURGERY      TONSILLECTOMY AND ADENOIDECTOMY      VASECTOMY           Social History     Socioeconomic History    Marital status:      Spouse name: Cherelle   Tobacco Use    Smoking status: Never    Smokeless tobacco: Former     Quit date: 2006   Substance and Sexual Activity    Alcohol use: No    Drug use: No    Sexual activity: Defer         History reviewed. No pertinent family history.    Objective  Physical Exam:  /93  " " Pulse 66   Temp 97.5 °F (36.4 °C) (Oral)   Resp 13   Ht 175.3 cm (69\")   Wt 107 kg (235 lb)   SpO2 93%   BMI 34.70 kg/m²      Physical Exam  Vitals and nursing note reviewed.   Constitutional:       Appearance: He is well-developed and normal weight.   HENT:      Head: Normocephalic and atraumatic.   Eyes:      Extraocular Movements: Extraocular movements intact.      Pupils: Pupils are equal, round, and reactive to light.   Cardiovascular:      Rate and Rhythm: Normal rate and regular rhythm.      Heart sounds: Normal heart sounds.   Pulmonary:      Effort: Pulmonary effort is normal.      Breath sounds: Normal breath sounds.   Musculoskeletal:         General: Normal range of motion.      Cervical back: Normal range of motion and neck supple.   Skin:     General: Skin is warm.      Capillary Refill: Capillary refill takes less than 2 seconds.   Neurological:      General: No focal deficit present.      Mental Status: He is alert and oriented to person, place, and time.   Psychiatric:         Mood and Affect: Mood normal.         Behavior: Behavior normal.         Procedures    ED Course:    ED Course as of 12/15/24 1742   Sun Dec 15, 2024   1637   EKG Interpretation    Evaluated and interpreted by emergency department physician    Rhythm: Normal Sinus Rhythm  Rate: Normal  Axis: Fabiola  Ectopy: none  Conduction: Normal  ST Segments: Normal  T Waves: T wave flattening in lead III  Q Waves: Nonspecific Q waves in aVR    Clinical Impression: Normal Sinus Rhythm  V2 is missing.  Maciej Shah DO   [CR]      ED Course User Index  [CR] Maciej Shah DO       Lab Results (last 24 hours)       Procedure Component Value Units Date/Time    CBC & Differential [[id number]]  (Normal) Collected: 12/15/24 1651    Specimen: Blood Updated: 12/15/24 1707    Narrative:      The following orders were created for panel order CBC & Differential.  Procedure                               Abnormality         " Status                     ---------                               -----------         ------                     CBC Auto Differential[[id number]]        Normal              Final result                 Please view results for these tests on the individual orders.    Comprehensive Metabolic Panel [[id number]] Collected: 12/15/24 1651    Specimen: Blood Updated: 12/15/24 1701    High Sensitivity Troponin T [[id number]]  (Normal) Collected: 12/15/24 1651    Specimen: Blood Updated: 12/15/24 1733     HS Troponin T <6 ng/L     Narrative:      High Sensitive Troponin T Reference Range:  <14.0 ng/L- Negative Female for AMI  <22.0 ng/L- Negative Male for AMI  >=14 - Abnormal Female indicating possible myocardial injury.  >=22 - Abnormal Male indicating possible myocardial injury.   Clinicians would have to utilize clinical acumen, EKG, Troponin, and serial changes to determine if it is an Acute Myocardial Infarction or myocardial injury due to an underlying chronic condition.         CBC Auto Differential [[id number]]  (Normal) Collected: 12/15/24 1651    Specimen: Blood Updated: 12/15/24 1707     WBC 7.60 10*3/mm3      RBC 4.90 10*6/mm3      Hemoglobin 15.1 g/dL      Hematocrit 44.3 %      MCV 90.4 fL      MCH 30.8 pg      MCHC 34.1 g/dL      RDW 13.1 %      RDW-SD 43.1 fl      MPV 9.9 fL      Platelets 258 10*3/mm3      Neutrophil % 46.4 %      Lymphocyte % 40.8 %      Monocyte % 9.9 %      Eosinophil % 1.7 %      Basophil % 1.1 %      Immature Grans % 0.1 %      Neutrophils, Absolute 3.53 10*3/mm3      Lymphocytes, Absolute 3.10 10*3/mm3      Monocytes, Absolute 0.75 10*3/mm3      Eosinophils, Absolute 0.13 10*3/mm3      Basophils, Absolute 0.08 10*3/mm3      Immature Grans, Absolute 0.01 10*3/mm3      nRBC 0.0 /100 WBC              XR Chest 1 View    Result Date: 12/15/2024  PROCEDURE: XR CHEST 1 VW-  HISTORY: Chest Pain Triage Protocol  COMPARISON: None.  FINDINGS: The heart is normal in size. The lungs are clear. The  mediastinum is unremarkable. There is no pneumothorax.  There are no acute osseous abnormalities. Patient is rotated to the right. Decreased inspiratory effort noted.      Impression: No acute cardiopulmonary process.      This report was signed and finalized on 12/15/2024 4:47 PM by Vivian Morgan MD.          MDM      Initial impression of presenting illness: Patient is a 54-year-old male with history of pancreatitis and CVA.  He presents to the ER today with left-sided anterior chest pain that radiates into right side of neck.  Reports some lightheadedness and shortness of breath.  Reports that symptoms started approximately 20 minutes PTA.  Denies abdominal pain.  Denies changes in bowel or bladder habit.  Denies OTC medication or home remedy.  Denies alleviating or exacerbating factors.    DDX: includes but is not limited to: Myocardial infarction, pancreatitis, strain, sprain, lung infection or other    Patient arrives stable with vitals interpreted by myself.     Pertinent features from physical exam: Lung sounds are clear bilaterally throughout.  Ab soft nontender.  Bowel sounds normal.  Heart sounds normal..    Initial diagnostic plan: CBC, CMP, troponin, lipase, EKG, chest x-ray, CT angio PE protocol, CT abdomen pelvis with contrast    Results from initial plan were reviewed and interpreted by me revealing CBC is within normal parameter.  CMP is within appropriate range.  Troponin initial is negative.  EKG normal sinus rhythm rate of 68 bpm.  Chest x-ray with the following impression no acute cardiopulmonary process.  Patient with negative CT pulmonary embolism study and negative CT abdomen.    Diagnostic information from other sources: Chart review    Interventions / Re-evaluation: Vital signs stable throughout encounter    Results/clinical rationale were discussed with patient    Consultations/Discussion of results with other physicians: Spoke with Dr. Herrmann.  Given that patient is still having neck and  shoulder pain.  His recommendation is to bring patient into the hospital.    Disposition plan: Patient mated to hospitalist group for further management and care of chest pain.        Final diagnoses:   None          Maciej Shah DO  12/15/24 2349      Electronically signed by Maciej Shah DO at 12/15/24 2349       Vital Signs (last day)       Date/Time Temp Temp src Pulse Resp BP Patient Position SpO2    12/16/24 1112 98 (36.7) Oral 55 18 114/73 Lying 96    12/16/24 0736 97.8 (36.6) Oral 69 18 116/77 Lying 96    12/16/24 0500 97.8 (36.6) Oral 64 14 110/78 Sitting 95    12/15/24 2300 97.7 (36.5) Oral 65 16 128/82 Lying 95    12/15/24 2115 97.7 (36.5) Oral 59 14 142/86 Lying 94    12/15/24 2041 -- -- 59 -- 143/92 -- 94    12/15/24 2001 -- -- 65 -- 140/92 -- 94    12/15/24 1941 -- -- 66 -- 135/86 -- 93    12/15/24 1920 -- -- 64 -- 139/84 -- 95    12/15/24 1915 -- -- 60 -- 141/88 -- 97    12/15/24 1900 -- -- 66 -- 142/86 -- 96    12/15/24 1839 -- -- 64 -- 148/93 -- 96    12/15/24 1759 -- -- 63 -- 126/90 -- 96    12/15/24 17:52:56 -- -- 64 14 122/83 -- 96    12/15/24 1738 -- -- 65 -- 122/83 -- 97    12/15/24 16:57:42 -- -- 66 13 142/93 -- 93    12/15/24 1603 97.5 (36.4) Oral 70 20 148/90 -- 97          Current Facility-Administered Medications   Medication Dose Route Frequency Provider Last Rate Last Admin    acetaminophen (TYLENOL) tablet 650 mg  650 mg Oral Q4H PRN Macho Arechiga DO   650 mg at 12/16/24 0800    Or    acetaminophen (TYLENOL) 160 MG/5ML oral solution 650 mg  650 mg Oral Q4H PRN Macho Arechiga DO        Or    acetaminophen (TYLENOL) suppository 650 mg  650 mg Rectal Q4H PRN Macho Arechiga DO        aspirin EC tablet 81 mg  81 mg Oral Daily Macho Arechiga DO   81 mg at 12/16/24 0800    atorvastatin (LIPITOR) tablet 80 mg  80 mg Oral Nightly Macho Arechiga DO   80 mg at 12/15/24 2337    sennosides-docusate (PERICOLACE) 8.6-50 MG per tablet 2 tablet  2 tablet Oral BID  PRN Macho Arechiga DO        And    polyethylene glycol (MIRALAX) packet 17 g  17 g Oral Daily PRN Macho Arechiga DO        And    bisacodyl (DULCOLAX) EC tablet 5 mg  5 mg Oral Daily PRN Macho Arechiga DO        And    bisacodyl (DULCOLAX) suppository 10 mg  10 mg Rectal Daily PRN Macho Arechiga DO        Calcium Replacement - Follow Nurse / BPA Driven Protocol   Not Applicable PRN Macho Arechiga DO        cyclobenzaprine (FLEXERIL) tablet 10 mg  10 mg Oral BID PRN Macho Arechiga DO        Enoxaparin Sodium (LOVENOX) syringe 105 mg  1 mg/kg Subcutaneous Q12H Kerley, Brian Joseph, DO        losartan (COZAAR) tablet 50 mg  50 mg Oral Daily Macho Arechiga DO   50 mg at 12/16/24 0800    Magnesium Standard Dose Replacement - Follow Nurse / BPA Driven Protocol   Not Applicable PRN Macho Arechiga DO        melatonin tablet 5 mg  5 mg Oral Nightly PRN Macho Arechiga DO        nitroglycerin (NITROSTAT) SL tablet 0.4 mg  0.4 mg Sublingual Q5 Min PRN Macho Arechiga DO   0.4 mg at 12/16/24 0620    ondansetron (ZOFRAN) injection 4 mg  4 mg Intravenous Q6H PRN Macho Arechiga DO        pantoprazole (PROTONIX) EC tablet 40 mg  40 mg Oral Q AM Macoh Arechiga DO   40 mg at 12/16/24 0718    Pharmacy to Dose enoxaparin (LOVENOX)   Not Applicable Continuous PRN Macho Arechiga DO        Phosphorus Replacement - Follow Nurse / BPA Driven Protocol   Not Applicable PRN Macho Arechiga DO        Potassium Replacement - Follow Nurse / BPA Driven Protocol   Not Applicable PRN Macho Arechiga DO        sodium chloride 0.9 % flush 10 mL  10 mL Intravenous PRN Dale Grey DO        sodium chloride 0.9 % flush 10 mL  10 mL Intravenous Q12H Macho Arechiga DO   10 mL at 12/16/24 0801    sodium chloride 0.9 % flush 10 mL  10 mL Intravenous PRN Macho Arechiga DO        sodium chloride 0.9 % infusion 40 mL  40 mL Intravenous PRN Hawk, Macho, DO        tamsulosin (FLOMAX) 24 hr capsule 0.4 mg  0.4 mg Oral  Macho Zaldivar DO         Lab Results (last 24 hours)       Procedure Component Value Units Date/Time    Basic Metabolic Panel [[id number]]  (Abnormal) Collected: 12/16/24 0843    Specimen: Blood Updated: 12/16/24 0928     Glucose 108 mg/dL      BUN 14 mg/dL      Creatinine 1.08 mg/dL      Sodium 140 mmol/L      Potassium 4.1 mmol/L      Chloride 104 mmol/L      CO2 24.7 mmol/L      Calcium 8.9 mg/dL      BUN/Creatinine Ratio 13.0     Anion Gap 11.3 mmol/L      eGFR 81.5 mL/min/1.73     Narrative:      GFR Categories in Chronic Kidney Disease (CKD)      GFR Category          GFR (mL/min/1.73)    Interpretation  G1                     90 or greater         Normal or high (1)  G2                      60-89                Mild decrease (1)  G3a                   45-59                Mild to moderate decrease  G3b                   30-44                Moderate to severe decrease  G4                    15-29                Severe decrease  G5                    14 or less           Kidney failure          (1)In the absence of evidence of kidney disease, neither GFR category G1 or G2 fulfill the criteria for CKD.    eGFR calculation 2021 CKD-EPI creatinine equation, which does not include race as a factor    Hemoglobin A1c [[id number]]  (Abnormal) Collected: 12/16/24 0843    Specimen: Blood Updated: 12/16/24 0928     Hemoglobin A1C 6.70 %     Narrative:      Hemoglobin A1C Ranges:    Increased Risk for Diabetes  5.7% to 6.4%  Diabetes                     >= 6.5%  Diabetic Goal                < 7.0%    Lipid Panel [[id number]] Collected: 12/16/24 0843    Specimen: Blood Updated: 12/16/24 0928     Total Cholesterol 147 mg/dL      Triglycerides 102 mg/dL      HDL Cholesterol 49 mg/dL      LDL Cholesterol  79 mg/dL      VLDL Cholesterol 19 mg/dL      LDL/HDL Ratio 1.58    Narrative:      Cholesterol Reference Ranges  (U.S. Department of Health and Human Services ATP III Classifications)    Desirable          <200  mg/dL  Borderline High    200-239 mg/dL  High Risk          >240 mg/dL      Triglyceride Reference Ranges  (U.S. Department of Health and Human Services ATP III Classifications)    Normal           <150 mg/dL  Borderline High  150-199 mg/dL  High             200-499 mg/dL  Very High        >500 mg/dL    HDL Reference Ranges  (U.S. Department of Health and Human Services ATP III Classifications)    Low     <40 mg/dl (major risk factor for CHD)  High    >60 mg/dl ('negative' risk factor for CHD)        LDL Reference Ranges  (U.S. Department of Health and Human Services ATP III Classifications)    Optimal          <100 mg/dL  Near Optimal     100-129 mg/dL  Borderline High  130-159 mg/dL  High             160-189 mg/dL  Very High        >189 mg/dL    CBC Auto Differential [[id number]]  (Normal) Collected: 12/16/24 0843    Specimen: Blood Updated: 12/16/24 0906     WBC 5.39 10*3/mm3      RBC 4.83 10*6/mm3      Hemoglobin 14.7 g/dL      Hematocrit 43.8 %      MCV 90.7 fL      MCH 30.4 pg      MCHC 33.6 g/dL      RDW 13.2 %      RDW-SD 43.7 fl      MPV 9.8 fL      Platelets 238 10*3/mm3      Neutrophil % 43.0 %      Lymphocyte % 42.5 %      Monocyte % 10.0 %      Eosinophil % 3.2 %      Basophil % 1.1 %      Immature Grans % 0.2 %      Neutrophils, Absolute 2.32 10*3/mm3      Lymphocytes, Absolute 2.29 10*3/mm3      Monocytes, Absolute 0.54 10*3/mm3      Eosinophils, Absolute 0.17 10*3/mm3      Basophils, Absolute 0.06 10*3/mm3      Immature Grans, Absolute 0.01 10*3/mm3      nRBC 0.0 /100 WBC     Lipase [[id number]]  (Normal) Collected: 12/15/24 1752    Specimen: Blood Updated: 12/15/24 1826     Lipase 21 U/L     High Sensitivity Troponin T 1Hr [[id number]] Collected: 12/15/24 1752    Specimen: Blood Updated: 12/15/24 1820     HS Troponin T <6 ng/L      Troponin T Delta --     Comment: Unable to calculate.       Narrative:      High Sensitive Troponin T Reference Range:  <14.0 ng/L- Negative Female for AMI  <22.0 ng/L-  Negative Male for AMI  >=14 - Abnormal Female indicating possible myocardial injury.  >=22 - Abnormal Male indicating possible myocardial injury.   Clinicians would have to utilize clinical acumen, EKG, Troponin, and serial changes to determine if it is an Acute Myocardial Infarction or myocardial injury due to an underlying chronic condition.         Comprehensive Metabolic Panel [[id number]]  (Abnormal) Collected: 12/15/24 1651    Specimen: Blood Updated: 12/15/24 1743     Glucose 132 mg/dL      BUN 12 mg/dL      Creatinine 1.19 mg/dL      Sodium 141 mmol/L      Potassium 4.4 mmol/L      Comment: Slight hemolysis detected by analyzer. Result may be falsely elevated.        Chloride 102 mmol/L      CO2 26.1 mmol/L      Calcium 9.3 mg/dL      Total Protein 7.3 g/dL      Albumin 4.3 g/dL      ALT (SGPT) 64 U/L      AST (SGOT) 39 U/L      Comment: Slight hemolysis detected by analyzer. Result may be falsely elevated.        Alkaline Phosphatase 86 U/L      Total Bilirubin 0.3 mg/dL      Globulin 3.0 gm/dL      A/G Ratio 1.4 g/dL      BUN/Creatinine Ratio 10.1     Anion Gap 12.9 mmol/L      eGFR 72.6 mL/min/1.73     Narrative:      GFR Categories in Chronic Kidney Disease (CKD)      GFR Category          GFR (mL/min/1.73)    Interpretation  G1                     90 or greater         Normal or high (1)  G2                      60-89                Mild decrease (1)  G3a                   45-59                Mild to moderate decrease  G3b                   30-44                Moderate to severe decrease  G4                    15-29                Severe decrease  G5                    14 or less           Kidney failure          (1)In the absence of evidence of kidney disease, neither GFR category G1 or G2 fulfill the criteria for CKD.    eGFR calculation 2021 CKD-EPI creatinine equation, which does not include race as a factor    High Sensitivity Troponin T [[id number]]  (Normal) Collected: 12/15/24 1651    Specimen:  Blood Updated: 12/15/24 1733     HS Troponin T <6 ng/L     Narrative:      High Sensitive Troponin T Reference Range:  <14.0 ng/L- Negative Female for AMI  <22.0 ng/L- Negative Male for AMI  >=14 - Abnormal Female indicating possible myocardial injury.  >=22 - Abnormal Male indicating possible myocardial injury.   Clinicians would have to utilize clinical acumen, EKG, Troponin, and serial changes to determine if it is an Acute Myocardial Infarction or myocardial injury due to an underlying chronic condition.         CBC & Differential [[id number]]  (Normal) Collected: 12/15/24 1651    Specimen: Blood Updated: 12/15/24 1707    Narrative:      The following orders were created for panel order CBC & Differential.  Procedure                               Abnormality         Status                     ---------                               -----------         ------                     CBC Auto Differential[[id number]]        Normal              Final result                 Please view results for these tests on the individual orders.    CBC Auto Differential [[id number]]  (Normal) Collected: 12/15/24 1651    Specimen: Blood Updated: 12/15/24 1707     WBC 7.60 10*3/mm3      RBC 4.90 10*6/mm3      Hemoglobin 15.1 g/dL      Hematocrit 44.3 %      MCV 90.4 fL      MCH 30.8 pg      MCHC 34.1 g/dL      RDW 13.1 %      RDW-SD 43.1 fl      MPV 9.9 fL      Platelets 258 10*3/mm3      Neutrophil % 46.4 %      Lymphocyte % 40.8 %      Monocyte % 9.9 %      Eosinophil % 1.7 %      Basophil % 1.1 %      Immature Grans % 0.1 %      Neutrophils, Absolute 3.53 10*3/mm3      Lymphocytes, Absolute 3.10 10*3/mm3      Monocytes, Absolute 0.75 10*3/mm3      Eosinophils, Absolute 0.13 10*3/mm3      Basophils, Absolute 0.08 10*3/mm3      Immature Grans, Absolute 0.01 10*3/mm3      nRBC 0.0 /100 WBC     [place] Draw [[id number]] Collected: 12/15/24 1651    Specimen: Blood Updated: 12/15/24 1702    Narrative:      The following orders were created  for panel order [place] Draw.  Procedure                               Abnormality         Status                     ---------                               -----------         ------                     Green Top (Gel)[[id number]]                                  Final result               Lavender Top[[id number]]                                     Final result               Gold Top - SST[[id number]]                                   Final result               Light Blue Top[[id number]]                                   Final result                 Please view results for these tests on the individual orders.    Green Top (Gel) [[id number]] Collected: 12/15/24 1651    Specimen: Blood Updated: 12/15/24 1702     Extra Tube Hold for add-ons.     Comment: Auto resulted.       Lavender Top [[id number]] Collected: 12/15/24 1651    Specimen: Blood Updated: 12/15/24 1702     Extra Tube hold for add-on     Comment: Auto resulted       Gold Top - SST [[id number]] Collected: 12/15/24 1651    Specimen: Blood Updated: 12/15/24 1702     Extra Tube Hold for add-ons.     Comment: Auto resulted.       Light Blue Top [[id number]] Collected: 12/15/24 1651    Specimen: Blood Updated: 12/15/24 1702     Extra Tube Hold for add-ons.     Comment: Auto resulted             Imaging Results (Last 24 Hours)       Procedure Component Value Units Date/Time    CT Angiogram Chest Pulmonary Embolism [[id number]] Collected: 12/15/24 1957     Updated: 12/15/24 1958    Narrative:      FINAL REPORT    TECHNIQUE:  null    CLINICAL HISTORY:  Rule out PE    COMPARISON:  null    FINDINGS:  CT angiogram of chest with MIP postprocessing:    Comparison: None    FINDINGS:    Pulmonary arteries:    Pulmonary arteries are well-opacified, free of intraluminal thrombus.    Thoracic aorta:    Normal caliber without dissection.    Mediastinum/heart:    Heart size is normal. Normal RV/LV ratio. No pericardial effusion.    No enlarged mediastinal, hilar, or axillary lymph  nodes.    Trachea and mainstem bronchi of normal caliber without intraluminal lesion.    Visualized thyroid gland without mass or enlargement.    Esophagus without significant distention or evidence of mass.    Lungs/pleura:    Lungs are well expanded, free of infiltrates, soft tissue nodule, or chronic interstitial change.    No pleural effusion or pneumothorax.    Uppermost abdomen:    No adrenal mass. Contracted gallbladder without radiopaque/radiolucent gallstones.    Bone/MSK:    Moderate spondylosis of the thoracic spine.    No acute fracture involving the visualized bony thorax.    No destructive osseous lesion.      Impression:      IMPRESSION:    1. Negative for acute pulmonary artery embolism. Thoracic aorta of normal caliber without dissection.    2. No consolidation, pleural effusion, or pneumothorax.    3. Other findings above.    Authenticated and Electronically Signed by Jack Lane MD on  12/15/2024 07:57:09 PM    CT Abdomen Pelvis With Contrast [[id number]] Collected: 12/15/24 1940     Updated: 12/15/24 1942    Narrative:      FINAL REPORT    TECHNIQUE:  null    CLINICAL HISTORY:  Rule out dissection    COMPARISON:  null    FINDINGS:  CT abdomen and pelvis with IV contrast.    Comparison: None    FINDINGS:    Lung bases:    No soft tissue nodule, infiltrate, or pleural effusion.    Upper Abdomen:    The liver, spleen, and pancreas are within normal limits.    Gallbladder is contracted without intraluminal radiopaque/radiolucent calculus. No biliary dilatation.    Retroperitoneum/Pelvis:    No adrenal mass.    Abdominal aorta is of normal caliber without significant calcific atheromatous change. No abdominal aortic dissection.    Retroaortic left renal vein without significant stenosis.    No pathologic para-aortic adenopathy.    Normal-sized kidneys. Hyperdense renal pyramids bilaterally may reflect microcalcifications. No renal mass, hydronephrosis or nephrolithiasis.    Nondilated ureters without  calculus. Normal-appearing urinary bladder.    Bowel/Mesentery:    No colonic obstruction or inflammation, or diverticular disease.    Normal appendix positioned lateral to the inferior margin of the right lobe of the liver.    Small bowel without dilatation or mural thickening. Midabdomen mesenteric edema and mild adenopathy.    Mildly distended stomach without focal lesion.    The superior mesenteric artery and vein are patent.    Bone/Extraperitoneal Soft Tissues:    Mild spondylosis and scoliosis of the lumbar spine.    Unremarkable bony pelvis.    Extraperitoneal soft tissues of the abdomen and pelvis are unremarkable.      Impression:      IMPRESSION:    1. Normal caliber abdominal aorta without dissection. Patent mesenteric branches.    2. Midabdomen mesenteric edema and adenopathy may reflect mesenteric panniculitis.    3. Other findings above.    Authenticated and Electronically Signed by Jack Lane MD on  12/15/2024 07:40:48 PM    XR Chest 1 View [[id number]] Collected: 12/15/24 1647     Updated: 12/15/24 1649    Narrative:      PROCEDURE: XR CHEST 1 VW-     HISTORY: Chest Pain Triage Protocol     COMPARISON: None.     FINDINGS: The heart is normal in size. The lungs are clear. The  mediastinum is unremarkable. There is no pneumothorax.  There are no  acute osseous abnormalities. Patient is rotated to the right. Decreased  inspiratory effort noted.       Impression:      No acute cardiopulmonary process.                 This report was signed and finalized on 12/15/2024 4:47 PM by Vivian Morgan MD.             Physician Progress Notes (last 24 hours)  Notes from 12/15/24 1215 through 12/16/24 1215   No notes of this type exist for this encounter.

## 2024-12-16 NOTE — PLAN OF CARE
Goal Outcome Evaluation:  Plan of Care Reviewed With: patient           Outcome Evaluation: PT ADMITTED WITH DX OF CHEST PAIN.  PT IS ALERT AND ORIENTED X 4.  PT IS ON ROOM AIR WITH HEART RATE NSR.  PT IS TO HAVE STRESS TEST THIS AM.  NO OTHER DISTRESS NOTED.  WILL CONT TO MONITOR FOR CHANGES.

## 2024-12-16 NOTE — PROGRESS NOTES
"Pharmacy Consult - Enoxaparin Dosing    Aamir Rodriguez is a 54 y.o. male who has been consulted to dose enoxaparin for full anticoagulation (chest pain).     Allergies    Morphine and codeine, Oxycontin [oxycodone hcl], and Percocet [oxycodone-acetaminophen]    Relevant clinical data and objective history reviewed:     [Ht: 175.3 cm (69\"); Wt: 109 kg (239 lb 3.2 oz)]  Body mass index is 35.32 kg/m².    Estimated Creatinine Clearance: 86.3 mL/min (by C-G formula based on SCr of 1.19 mg/dL).    Results from last 7 days   Lab Units 12/15/24  1651   HEMOGLOBIN g/dL 15.1   HEMATOCRIT % 44.3   PLATELETS 10*3/mm3 258   CREATININE mg/dL 1.19       Asessment/Plan    Initiate Enoxaparin 110 mg SQ every 12 hours  Pharmacy will monitor Mr. Rodriguez's renal function and clinical status and adjust the enoxaparin dose and/or frequency as needed.    Thank you,  Emily Lopes RP,PharmD  12/16/2024  03:58 EST      "

## 2024-12-16 NOTE — CASE MANAGEMENT/SOCIAL WORK
Discharge Planning Assessment        Patient Name: Aamir Rodriguez  MRN: [id number]  Today's Date: 12/16/2024    Admit Date: 12/15/2024    Plan: Home wioth family   Discharge Needs Assessment       Row Name 12/16/24 0859       Living Environment    People in Home spouse    Current Living Arrangements home    In the past 12 months has the electric, gas, oil, or water company threatened to shut off services in your home? No    Primary Care Provided by self    Provides Primary Care For no one, unable/limited ability to care for self    Family Caregiver if Needed spouse    Quality of Family Relationships involved    Able to Return to Prior Arrangements yes       Resource/Environmental Concerns    Resource/Environmental Concerns none    Transportation Concerns none       Transportation Needs    In the past 12 months, has lack of transportation kept you from medical appointments or from getting medications? no    In the past 12 months, has lack of transportation kept you from meetings, work, or from getting things needed for daily living? No       Food Insecurity    Within the past 12 months, you worried that your food would run out before you got the money to buy more. Never true    Within the past 12 months, the food you bought just didn't last and you didn't have money to get more. Never true       Transition Planning    Patient/Family Anticipates Transition to home with family    Patient/Family Anticipated Services at Transition none       Discharge Needs Assessment    Readmission Within the Last 30 Days no previous admission in last 30 days    Equipment Currently Used at Home none    Do you want help finding or keeping work or a job? I do not need or want help    Do you want help with school or training? For example, starting or completing job training or getting a high school diploma, GED or equivalent No    Anticipated Changes Related to Illness none    Equipment Needed After Discharge none    Provided  Post Acute Provider List? N/A    Provided Post Acute Provider Quality & Resource List? N/A                   Discharge Plan       Row Name 12/16/24 0900       Plan    Plan Home with family    Plan Comments Confirmed address,phone ,phone number and primary care provider as being correct on face sheet .Independent with ADLS Uses [facility] declining Meds to Bed plan  is to return home    Final Discharge Disposition Code 01 - home or self-care                  Continued Care and Services - Admitted Since 12/15/2024    No active coordination exists for this encounter.          Demographic Summary       Row Name 12/16/24 0857       General Information    Admission Type observation    Arrived From emergency department    Required Notices Provided Observation Status Notice    Reason for Consult discharge planning    Preferred Language English      Row Name 12/16/24 0847       General Information    Admission Type observation                   Functional Status       Row Name 12/16/24 0858       Functional Status    Usual Activity Tolerance good    Current Activity Tolerance good       Physical Activity    On average, how many days per week do you engage in moderate to strenuous exercise (like a brisk walk)? 7 days    On average, how many minutes do you engage in exercise at this level? 20 min    Number of minutes of exercise per week 140       Functional Status, IADL    Medications independent    Meal Preparation independent    Housekeeping independent    Laundry independent    Shopping independent    If for any reason you need help with day-to-day activities such as bathing, preparing meals, shopping, managing finances, etc., do you get the help you need? I don't need any help       Mental Status    General Appearance WDL WDL                   Psychosocial    No documentation.                  Abuse/Neglect    No documentation.                  Legal    No documentation.                  Substance Abuse    No  documentation.                  Patient Forms    No documentation.                     Meghna Duarte RN

## 2024-12-16 NOTE — PROGRESS NOTES
[facility]    Name:  Aamir Rodriguez   Age:  54 y.o.  Sex:  male  :  1970  MRN:  [id number]   Visit Number:  [id number]  Admission Date:  12/15/2024  Date Of Service:  24  Primary Care Physician:  Juan Ramon Bianchi MD     LOS: 0 days :    Chief Complaint:      Follow-up; chest pain    Subjective:    Feels good this morning, no concerning pain.  Breathing feels good.  Just hungry.    Hospital Course:    Aamir Rodriguez is a 54-year-old male with history significant for kidney stones, pancreatitis, GERD, hyperlipidemia, migraines with strokelike symptoms in the past.  Who presented to the emergency room with acute onset left-sided chest pain.  Patient reports that pain radiated into the left side of neck and into the left shoulder.  Patient was sitting on the couch watching the [employer] football game when pain started.  Has never experienced this before.  Describes it as a pressure/throbbing sensation with radiation to left neck and left shoulder.  Chest pain has improved since arrival to the ER but pain is still present in left shoulder as of time of admission.  Patient denies associated shortness of breath, nausea or vomiting.  Denies recent illness.  Lifelong non-smoker.  Denies alcohol and illicit drug use.     ED summary: Patient afebrile, normotensive, nonhypoxic on room air.  Troponins negative x 2.  CMP largely unremarkable except for glucose 132.  CBC unremarkable.  Chest x-ray personally reviewed, no acute process.  CT of the chest negative for PE.  No consolidation effusion or pneumothorax.  CT abdomen pelvis shows no abdominal aorta dissection.  Mid abdomen mesenteric edema and adenopathy may reflect mesenteric panniculitis. Patient with continued left-sided chest pain.  On-call cardiologist, Dr. Herrmann contacted by ER.  Recommended admission.  Patient received aspirin therapy and hospitalist asked to admit.    Review of Systems:     All  "systems were reviewed and negative except as mentioned in subjective, assessment and plan.    Vital Signs:    Temp:  [97.7 °F (36.5 °C)-98 °F (36.7 °C)] 98 °F (36.7 °C)  Heart Rate:  [55-69] 61  Resp:  [13-18] 16  BP: (110-148)/(73-93) 131/81    Intake and output:    No intake/output data recorded.  I/O this shift:  In: 600 [P.O.:600]  Out: -     Physical Examination:    General Appearance:  Alert and cooperative.    Head:  Atraumatic and normocephalic.   Eyes: Conjunctivae and sclerae normal, no icterus. No pallor.   Throat: No oral lesions, no thrush, oral mucosa moist.   Neck: Supple, trachea midline, no thyromegaly.   Lungs:   Breath sounds heard bilaterally equally.  No wheezing or crackles. No Pleural rub or bronchial breathing.   Heart:  Normal S1 and S2, no murmur, no gallop, no rub. No JVD.   Abdomen:   Normal bowel sounds, no masses, no organomegaly. Soft, nontender, nondistended, no rebound tenderness.   Extremities: Supple, no edema, no cyanosis, no clubbing.   Skin: Warm.     Neurologic: Alert and oriented x 3. No facial asymmetry. Moves all four limbs. No tremors.      Laboratory results:    Results from last 7 days   Lab Units 12/16/24  0843 12/15/24  1651   SODIUM mmol/L 140 141   POTASSIUM mmol/L 4.1 4.4   CHLORIDE mmol/L 104 102   CO2 mmol/L 24.7 26.1   BUN mg/dL 14 12   CREATININE mg/dL 1.08 1.19   CALCIUM mg/dL 8.9 9.3   BILIRUBIN mg/dL  --  0.3   ALK PHOS U/L  --  86   ALT (SGPT) U/L  --  64*   AST (SGOT) U/L  --  39   GLUCOSE mg/dL 108* 132*     Results from last 7 days   Lab Units 12/16/24  0843 12/15/24  1651   WBC 10*3/mm3 5.39 7.60   HEMOGLOBIN g/dL 14.7 15.1   HEMATOCRIT % 43.8 44.3   PLATELETS 10*3/mm3 238 258         Results from last 7 days   Lab Units 12/15/24  1752 12/15/24  1651   HSTROP T ng/L <6 <6         No results for input(s): \"PHART\", \"NEE3SKV\", \"PO2ART\", \"NPX1LTP\", \"BASEEXCESS\" in the last 8760 hours.   I have reviewed the patient's laboratory results.    Radiology " results:    CT Angiogram Chest Pulmonary Embolism    Result Date: 12/15/2024  FINAL REPORT TECHNIQUE: null CLINICAL HISTORY: Rule out PE COMPARISON: null FINDINGS: CT angiogram of chest with MIP postprocessing: Comparison: None FINDINGS: Pulmonary arteries: Pulmonary arteries are well-opacified, free of intraluminal thrombus. Thoracic aorta: Normal caliber without dissection. Mediastinum/heart: Heart size is normal. Normal RV/LV ratio. No pericardial effusion. No enlarged mediastinal, hilar, or axillary lymph nodes. Trachea and mainstem bronchi of normal caliber without intraluminal lesion. Visualized thyroid gland without mass or enlargement. Esophagus without significant distention or evidence of mass. Lungs/pleura: Lungs are well expanded, free of infiltrates, soft tissue nodule, or chronic interstitial change. No pleural effusion or pneumothorax. Uppermost abdomen: No adrenal mass. Contracted gallbladder without radiopaque/radiolucent gallstones. Bone/MSK: Moderate spondylosis of the thoracic spine. No acute fracture involving the visualized bony thorax. No destructive osseous lesion.     Impression: IMPRESSION: 1. Negative for acute pulmonary artery embolism. Thoracic aorta of normal caliber without dissection. 2. No consolidation, pleural effusion, or pneumothorax. 3. Other findings above. Authenticated and Electronically Signed by Jack Lane MD on 12/15/2024 07:57:09 PM    CT Abdomen Pelvis With Contrast    Result Date: 12/15/2024  FINAL REPORT TECHNIQUE: null CLINICAL HISTORY: Rule out dissection COMPARISON: null FINDINGS: CT abdomen and pelvis with IV contrast. Comparison: None FINDINGS: Lung bases: No soft tissue nodule, infiltrate, or pleural effusion. Upper Abdomen: The liver, spleen, and pancreas are within normal limits. Gallbladder is contracted without intraluminal radiopaque/radiolucent calculus. No biliary dilatation. Retroperitoneum/Pelvis: No adrenal mass. Abdominal aorta is of normal caliber  without significant calcific atheromatous change. No abdominal aortic dissection. Retroaortic left renal vein without significant stenosis. No pathologic para-aortic adenopathy. Normal-sized kidneys. Hyperdense renal pyramids bilaterally may reflect microcalcifications. No renal mass, hydronephrosis or nephrolithiasis. Nondilated ureters without calculus. Normal-appearing urinary bladder. Bowel/Mesentery: No colonic obstruction or inflammation, or diverticular disease. Normal appendix positioned lateral to the inferior margin of the right lobe of the liver. Small bowel without dilatation or mural thickening. Midabdomen mesenteric edema and mild adenopathy. Mildly distended stomach without focal lesion. The superior mesenteric artery and vein are patent. Bone/Extraperitoneal Soft Tissues: Mild spondylosis and scoliosis of the lumbar spine. Unremarkable bony pelvis. Extraperitoneal soft tissues of the abdomen and pelvis are unremarkable.     Impression: IMPRESSION: 1. Normal caliber abdominal aorta without dissection. Patent mesenteric branches. 2. Midabdomen mesenteric edema and adenopathy may reflect mesenteric panniculitis. 3. Other findings above. Authenticated and Electronically Signed by Jack Lane MD on 12/15/2024 07:40:48 PM    XR Chest 1 View    Result Date: 12/15/2024  PROCEDURE: XR CHEST 1 VW-  HISTORY: Chest Pain Triage Protocol  COMPARISON: None.  FINDINGS: The heart is normal in size. The lungs are clear. The mediastinum is unremarkable. There is no pneumothorax.  There are no acute osseous abnormalities. Patient is rotated to the right. Decreased inspiratory effort noted.      Impression: No acute cardiopulmonary process.      This report was signed and finalized on 12/15/2024 4:47 PM by Vivian Morgan MD.     I have reviewed the patient's radiology reports.    Medication Review:     I have reviewed the patient's active and prn medications.     Problem List:      Chest  pain      Assessment/Plan:    Observation general floor admission 12/15/2024 with chest pain.      Chest pain  Cardiology consultation, recommendations appreciated.  Plan for cardiac catheterization 12/17.  Therapeutic Lovenox, high intensity statin, losartan, aspirin.  Was reportedly unable to complete stress test.    Chronic: kidney stones, pancreatitis, GERD, hyperlipidemia, migraines with strokelike symptoms in the past    DVT Prophylaxis: Therapeutic Lovenox  Code Status: Full code  Diet: N.p.o. midnight  Discharge Plan: Pending cardiac catheterization 12/17    Brian Joseph Kerley, DO  12/16/24  16:18 EST    Dictated utilizing Dragon dictation.    I have reviewed the copied text and it is accurate as of 12/16/2024

## 2024-12-16 NOTE — CONSULTS
G-Cardiology Consult Note    Referring Provider: Kerley  Reason for Consultation: Chest pain    Patient Care Team:  Juan Ramon Bianchi MD as PCP - General (Family Medicine)    Chief complaint : Chest pain    Subjective:    History of present illness: This is a 54-year-old male patient who presents to the emergency room with atypical chest pain.  In the emergency room his twelve-lead electrocardiogram showed sinus rhythm with no ischemic ST-T wave changes or injury current.  Cardiac troponins were serially normal.  The patient was scheduled to have a vasodilator nuclear stress test but could not perform the study due to severe claustrophobia.  He has no personal history of myocardial infarction or coronary revascularization.  He is a non-smoker.  He has no personal history of hypertension diabetes or dyslipidemia.  He has had recurrent chest discomfort relieved with sublingual nitroglycerin.    Review of Systems   Review of Systems   Constitutional: Negative for chills, diaphoresis, fever, malaise/fatigue, weight gain and weight loss.   HENT:  Negative for ear discharge, hearing loss, hoarse voice and nosebleeds.    Eyes:  Negative for discharge, double vision, pain and photophobia.   Cardiovascular:  Positive for chest pain. Negative for claudication, cyanosis, dyspnea on exertion, irregular heartbeat, leg swelling, near-syncope, orthopnea, palpitations, paroxysmal nocturnal dyspnea and syncope.   Respiratory:  Negative for cough, hemoptysis, sputum production and wheezing.    Endocrine: Negative for cold intolerance, heat intolerance, polydipsia, polyphagia and polyuria.   Hematologic/Lymphatic: Negative for adenopathy and bleeding problem. Does not bruise/bleed easily.   Skin:  Negative for color change, flushing, itching and rash.   Musculoskeletal:  Negative for muscle cramps, muscle weakness, myalgias and stiffness.   Gastrointestinal:  Negative for abdominal pain, diarrhea, hematemesis, hematochezia,  nausea and vomiting.   Genitourinary:  Negative for dysuria, frequency and nocturia.   Neurological:  Negative for focal weakness, loss of balance, numbness, paresthesias and seizures.   Psychiatric/Behavioral:  Negative for altered mental status, hallucinations and suicidal ideas.    Allergic/Immunologic: Negative for HIV exposure, hives and persistent infections.       History  Past Medical History:   Diagnosis Date    Arthritis     GERD (gastroesophageal reflux disease)     Kidney stones     Pancreatitis 04/2016    Stroke    ,   Past Surgical History:   Procedure Laterality Date    KNEE ARTHROSCOPY Bilateral     RECONSTRUCTION OF NOSE      REPLACEMENT TOTAL KNEE BILATERAL      rt x 2    SHOULDER ARTHROSCOPY Bilateral     x 3    TEMPOROMANDIBULAR JOINT SURGERY      TONSILLECTOMY AND ADENOIDECTOMY      VASECTOMY     , History reviewed. No pertinent family history.,   Social History     Tobacco Use    Smoking status: Never    Smokeless tobacco: Former     Quit date: 2006   Vaping Use    Vaping status: Never Used   Substance Use Topics    Alcohol use: No    Drug use: No   ,   Medications Prior to Admission   Medication Sig Dispense Refill Last Dose/Taking    omeprazole (priLOSEC) 20 MG capsule Take 1 capsule by mouth Daily.   12/15/2024    Pancrelipase, Lip-Prot-Amyl, (CREON) 94684-595315 units capsule delayed-release particles capsule Take 1 capsule by mouth. One capsule in the morning and one capsule in the evening as needed   12/15/2024    acetaminophen (TYLENOL) 500 MG tablet Take 1 tablet by mouth Every 6 (Six) Hours As Needed for Mild Pain. 20 tablet 0 Unknown    aspirin 81 MG EC tablet Take 81 mg by mouth Daily. (Patient not taking: Reported on 12/16/2024)   Not Taking    atorvastatin (LIPITOR) 20 MG tablet Take 1 tablet by mouth Daily.   12/14/2024    cetirizine (zyrTEC) 10 MG tablet Take 10 mg by mouth Daily As Needed for Allergies. (Patient not taking: Reported on 12/16/2024)   Not Taking    cyclobenzaprine  "(FLEXERIL) 10 MG tablet Take 10 mg by mouth 2 (Two) Times a Day As Needed for Muscle Spasms. (Patient not taking: Reported on 12/16/2024)   Not Taking    ibuprofen (ADVIL,MOTRIN) 600 MG tablet Take 1 tablet by mouth Every 6 (Six) Hours As Needed for Mild Pain or Moderate Pain. (Patient not taking: Reported on 12/16/2024) 20 tablet 0 Not Taking    Multiple Vitamins-Minerals (MULTIVITAMIN ADULT PO) Take 1 tablet by mouth Daily. (Patient not taking: Reported on 12/16/2024)   Not Taking    ondansetron ODT (ZOFRAN-ODT) 4 MG disintegrating tablet Take 1 tablet by mouth 4 (Four) Times a Day As Needed for Nausea or Vomiting. (Patient not taking: Reported on 12/16/2024) 12 tablet 0 Not Taking    oxyCODONE (Roxicodone) 5 MG immediate release tablet Take 1 tablet by mouth Every 4 (Four) Hours As Needed for Moderate Pain or Severe Pain. (Patient not taking: Reported on 12/16/2024) 6 tablet 0 Not Taking    tamsulosin (FLOMAX) 0.4 MG capsule 24 hr capsule Take 1 capsule by mouth Every Night. (Patient not taking: Reported on 12/16/2024) 14 capsule 0 Not Taking    and Allergies:  Morphine and codeine, Oxycontin [oxycodone hcl], and Percocet [oxycodone-acetaminophen]    Objective:    Vital Sign Min/Max for last 24 hours  Temp  Min: 97.5 °F (36.4 °C)  Max: 98 °F (36.7 °C)   BP  Min: 110/78  Max: 148/93   Pulse  Min: 55  Max: 70   Resp  Min: 13  Max: 20   SpO2  Min: 93 %  Max: 97 %   No data recorded   Weight  Min: 107 kg (235 lb)  Max: 109 kg (239 lb 3.2 oz)     Flowsheet Rows      Flowsheet Row First Filed Value   Admission Height 175.3 cm (69\") Documented at 12/15/2024 1603   Admission Weight 107 kg (235 lb) Documented at 12/15/2024 1603               Ejection Fraction  No results found for: \"EF\"    Echo EF Estimated  No results found for: \"ECHOEFEST\"    Nuclear Stress Ejection Fraction  No components found for: \"NUCEF\"    Cath Ejection Fraction Quantitative  No results found for: \"CATHEF\"    Physical Exam:   Vitals and nursing " note reviewed.   Constitutional:       Appearance: Healthy appearance. Not in distress.   Neck:      Vascular: No JVR. JVD normal.   Pulmonary:      Effort: Pulmonary effort is normal.      Breath sounds: Normal breath sounds. No wheezing. No rhonchi. No rales.   Chest:      Chest wall: Not tender to palpatation.   Cardiovascular:      PMI at left midclavicular line. Normal rate. Regular rhythm. Normal S1. Normal S2.       Murmurs: There is no murmur.      No gallop.  No click. No rub.   Pulses:     Intact distal pulses.   Edema:     Peripheral edema absent.   Abdominal:      General: Bowel sounds are normal.      Palpations: Abdomen is soft.      Tenderness: There is no abdominal tenderness.   Musculoskeletal: Normal range of motion.         General: No tenderness. Skin:     General: Skin is warm and dry.   Neurological:      General: No focal deficit present.      Mental Status: Alert and oriented to person, place and time.         Results Review:   I reviewed the patient's new clinical results.  Results from last 7 days   Lab Units 12/16/24  0843 12/15/24  1651   WBC 10*3/mm3 5.39 7.60   HEMOGLOBIN g/dL 14.7 15.1   HEMATOCRIT % 43.8 44.3   PLATELETS 10*3/mm3 238 258     Results from last 7 days   Lab Units 12/16/24  0843 12/15/24  1651   SODIUM mmol/L 140 141   POTASSIUM mmol/L 4.1 4.4   CHLORIDE mmol/L 104 102   CO2 mmol/L 24.7 26.1   BUN mg/dL 14 12   CREATININE mg/dL 1.08 1.19   GLUCOSE mg/dL 108* 132*   CALCIUM mg/dL 8.9 9.3     Lab Results   Lab Value Date/Time    TROPONINT <6 12/15/2024 1752    TROPONINT <6 12/15/2024 1651     Results from last 7 days   Lab Units 12/16/24  0843   CHOLESTEROL mg/dL 147   TRIGLYCERIDES mg/dL 102   HDL CHOL mg/dL 49   LDL CHOL mg/dL 79         Assessment/Plan:      Chest pain      I have recommended invasive coronary angiography with interventional standby.  Mr. Rodriguez has been engaged in a patient level discussion explaining the rationale for invasive procedure.  The  procedure of coronary angiography with catheter-based coronary revascularization has been explained in detail, using layman's terminology, including risks, benefits and alternatives.  He expresses understanding and desire to proceed.  Further recommendations will be predicated on the results of his catheterization findings.    I discussed the patient's findings and my recommendations with patient    Agustín Lyn MD  12/16/24  14:32 EST

## 2024-12-17 VITALS
OXYGEN SATURATION: 95 % | BODY MASS INDEX: 35.43 KG/M2 | TEMPERATURE: 98.2 F | SYSTOLIC BLOOD PRESSURE: 114 MMHG | RESPIRATION RATE: 15 BRPM | WEIGHT: 239.2 LBS | HEIGHT: 69 IN | DIASTOLIC BLOOD PRESSURE: 80 MMHG | HEART RATE: 64 BPM

## 2024-12-17 PROCEDURE — C1887 CATHETER, GUIDING: HCPCS | Performed by: INTERNAL MEDICINE

## 2024-12-17 PROCEDURE — 93454 CORONARY ARTERY ANGIO S&I: CPT | Performed by: INTERNAL MEDICINE

## 2024-12-17 PROCEDURE — 25510000001 IOPAMIDOL PER 1 ML: Performed by: INTERNAL MEDICINE

## 2024-12-17 PROCEDURE — 25010000002 FENTANYL CITRATE (PF) 50 MCG/ML SOLUTION: Performed by: INTERNAL MEDICINE

## 2024-12-17 PROCEDURE — C1894 INTRO/SHEATH, NON-LASER: HCPCS | Performed by: INTERNAL MEDICINE

## 2024-12-17 PROCEDURE — C1769 GUIDE WIRE: HCPCS | Performed by: INTERNAL MEDICINE

## 2024-12-17 PROCEDURE — 25010000002 HEPARIN (PORCINE) PER 1000 UNITS: Performed by: INTERNAL MEDICINE

## 2024-12-17 PROCEDURE — G0378 HOSPITAL OBSERVATION PER HR: HCPCS

## 2024-12-17 PROCEDURE — 25010000002 MIDAZOLAM PER 1MG: Performed by: INTERNAL MEDICINE

## 2024-12-17 PROCEDURE — 25010000002 LIDOCAINE 1 % SOLUTION: Performed by: INTERNAL MEDICINE

## 2024-12-17 PROCEDURE — 25010000002 ONDANSETRON PER 1 MG: Performed by: INTERNAL MEDICINE

## 2024-12-17 RX ORDER — NITROGLYCERIN 0.4 MG/1
0.4 TABLET SUBLINGUAL
Status: DISCONTINUED | OUTPATIENT
Start: 2024-12-17 | End: 2024-12-17 | Stop reason: HOSPADM

## 2024-12-17 RX ORDER — IOPAMIDOL 755 MG/ML
INJECTION, SOLUTION INTRAVASCULAR
Status: DISCONTINUED | OUTPATIENT
Start: 2024-12-17 | End: 2024-12-17 | Stop reason: HOSPADM

## 2024-12-17 RX ORDER — ONDANSETRON 2 MG/ML
4 INJECTION INTRAMUSCULAR; INTRAVENOUS EVERY 6 HOURS PRN
Status: DISCONTINUED | OUTPATIENT
Start: 2024-12-17 | End: 2024-12-17 | Stop reason: HOSPADM

## 2024-12-17 RX ORDER — ONDANSETRON 4 MG/1
4 TABLET, ORALLY DISINTEGRATING ORAL EVERY 6 HOURS PRN
Status: DISCONTINUED | OUTPATIENT
Start: 2024-12-17 | End: 2024-12-17 | Stop reason: HOSPADM

## 2024-12-17 RX ORDER — SODIUM CHLORIDE 9 MG/ML
100 INJECTION, SOLUTION INTRAVENOUS CONTINUOUS
Status: ACTIVE | OUTPATIENT
Start: 2024-12-17 | End: 2024-12-17

## 2024-12-17 RX ORDER — HEPARIN SODIUM 1000 [USP'U]/ML
INJECTION, SOLUTION INTRAVENOUS; SUBCUTANEOUS
Status: DISCONTINUED | OUTPATIENT
Start: 2024-12-17 | End: 2024-12-17 | Stop reason: HOSPADM

## 2024-12-17 RX ORDER — MIDAZOLAM HYDROCHLORIDE 2 MG/2ML
INJECTION, SOLUTION INTRAMUSCULAR; INTRAVENOUS
Status: DISCONTINUED | OUTPATIENT
Start: 2024-12-17 | End: 2024-12-17 | Stop reason: HOSPADM

## 2024-12-17 RX ORDER — LIDOCAINE HYDROCHLORIDE 10 MG/ML
INJECTION, SOLUTION INFILTRATION; PERINEURAL
Status: DISCONTINUED | OUTPATIENT
Start: 2024-12-17 | End: 2024-12-17 | Stop reason: HOSPADM

## 2024-12-17 RX ORDER — ONDANSETRON 2 MG/ML
INJECTION INTRAMUSCULAR; INTRAVENOUS
Status: DISCONTINUED | OUTPATIENT
Start: 2024-12-17 | End: 2024-12-17 | Stop reason: HOSPADM

## 2024-12-17 RX ORDER — ACETAMINOPHEN 325 MG/1
650 TABLET ORAL EVERY 4 HOURS PRN
Status: DISCONTINUED | OUTPATIENT
Start: 2024-12-17 | End: 2024-12-17 | Stop reason: HOSPADM

## 2024-12-17 RX ORDER — ASPIRIN 81 MG/1
81 TABLET ORAL DAILY
Qty: 30 TABLET | Refills: 0 | Status: SHIPPED | OUTPATIENT
Start: 2024-12-17

## 2024-12-17 RX ORDER — FENTANYL CITRATE 50 UG/ML
INJECTION, SOLUTION INTRAMUSCULAR; INTRAVENOUS
Status: DISCONTINUED | OUTPATIENT
Start: 2024-12-17 | End: 2024-12-17 | Stop reason: HOSPADM

## 2024-12-17 RX ORDER — VERAPAMIL HYDROCHLORIDE 2.5 MG/ML
INJECTION, SOLUTION INTRAVENOUS
Status: DISCONTINUED | OUTPATIENT
Start: 2024-12-17 | End: 2024-12-17 | Stop reason: HOSPADM

## 2024-12-17 RX ADMIN — ASPIRIN 81 MG: 81 TABLET, COATED ORAL at 08:07

## 2024-12-17 RX ADMIN — PANTOPRAZOLE SODIUM 40 MG: 40 TABLET, DELAYED RELEASE ORAL at 06:12

## 2024-12-17 RX ADMIN — Medication 10 ML: at 08:07

## 2024-12-17 RX ADMIN — LOSARTAN POTASSIUM 50 MG: 25 TABLET, FILM COATED ORAL at 08:07

## 2024-12-17 NOTE — PLAN OF CARE
Goal Outcome Evaluation:  Plan of Care Reviewed With: patient           Outcome Evaluation: PT RESTING IN BED DOCTOR NOT ABLE TO DO STRESS TEST YESTERDAY.  PT IS TO HAVE A HEART CATH THIS AM.  NO OTHER DISTRESS NOTED AT THIS TIME.  WILL CONT TO MONITOR FOR CHANGES.

## 2024-12-17 NOTE — CASE MANAGEMENT/SOCIAL WORK
Case Management Discharge Note      Final Note: DC home with family to transport. Denies any needs.    Provided Post Acute Provider List?: N/A  Provided Post Acute Provider Quality & Resource List?: N/A    Selected Continued Care - Discharged on 12/17/2024 Admission date: 12/15/2024 - Discharge disposition: Home or Self Care      Destination    No services have been selected for the patient.                Durable Medical Equipment    No services have been selected for the patient.                Dialysis/Infusion    No services have been selected for the patient.                Home Medical Care    No services have been selected for the patient.                Therapy    No services have been selected for the patient.                Community Resources    No services have been selected for the patient.                Community & DME    No services have been selected for the patient.                    Transportation Services  Private: Car    Final Discharge Disposition Code: 01 - home or self-care

## 2024-12-17 NOTE — DISCHARGE SUMMARY
[facility]   DISCHARGE SUMMARY      Name:  Aamir Rodriguez   Age:  54 y.o.  Sex:  male  :  1970  MRN:  [id number]   Visit Number:  [id number]    Admission Date:  12/15/2024  Date of Discharge:  2024  Primary Care Physician:  Juan Ramon Bianchi MD    Important issues to note:    1.  Patient with no prior history of angina was admitted with atypical chest pain at rest.  Serial troponins were negative.  He was seen by Dr. Lyn and did undergo cardiac catheterization through the right wrist approach on 2024 and was noted to have no significant coronary artery disease.  2.  Exact etiology of his chest pain is uncertain but could be musculoskeletal versus viral in nature.  Patient may continue baby aspirin for 1 month and then follow-up with primary care provider for further recommendations.  3.  Patient states that he has an appointment with sleep clinic to evaluate sleep apnea.  4. Midabdomen mesenteric edema and adenopathy noted on CT scan but his abdomen is nontender and benign.  If he has further symptoms, this may be worked up as an outpatient.    Discharge Diagnoses:     Chest pain, noncardiac, POA, resolved.  History of nephrolithiasis.  Chronic migraine.  GERD.    Problem List:     Active Hospital Problems    Diagnosis  POA    **Chest pain [R07.9]  Yes      Resolved Hospital Problems   No resolved problems to display.     Presenting Problem:    Chief Complaint   Patient presents with    Chest Pain      Consults:     Consulting Physician(s)         Provider   Role Specialty     Agustín Lyn MD      Consulting Physician Cardiology     Oscar Gunderson MD      Consulting Physician Cardiology          Procedures Performed:    Diagnostic Coronary angiography on 2024.    History of presenting illness/Hospital Course:    Aamir Rodriguez is a 54-year-old male with history significant for kidney stones, pancreatitis, GERD, hyperlipidemia, migraines  with strokelike symptoms in the past who presented to the emergency room with acute onset left-sided chest pain.  Patient reported that pain radiated into the left side of neck and into the left shoulder.  Patient was sitting on the couch watching the football game on TV when pain started.  Patient has never experienced this before.  Patient denied associated shortness of breath, nausea or vomiting.  Lifelong non-smoker.     ED summary: Patient afebrile, normotensive, nonhypoxic on room air.  Troponins negative x 2.  CMP largely unremarkable except for glucose 132.  CBC unremarkable.  Chest x-ray was unremarkable.  EKG showed no ischemic changes.  CT of the chest negative for PE.  No consolidation effusion or pneumothorax.  CT abdomen pelvis shows no abdominal aorta dissection.  Mid abdomen mesenteric edema and adenopathy may reflect mesenteric panniculitis.  Patient received aspirin in the emergency room and was subsequently admitted to the medical floor with telemetry.     Patient was seen by Dr. Lyn from cardiology.  Patient was scheduled for cardiac catheterization which was done on 12/17/2024 and showed near normal coronaries.  Patient is currently chest pain-free and is being discharged home.  He is independent in daily activities.  He will be discharged home and is advised to follow-up with his primary care provider in 1 week.  Fortunately he is not a current smoker.    Vital Signs:    Temp:  [97.8 °F (36.6 °C)-98.2 °F (36.8 °C)] 98.2 °F (36.8 °C)  Heart Rate:  [58-86] 61  Resp:  [9-18] 9  BP: (103-148)/() 112/82    Physical Exam:    General Appearance:  Alert and cooperative.    Head:  Atraumatic and normocephalic.   Eyes: Conjunctivae and sclerae normal, no icterus. No pallor.   Ears:  Ears with no abnormalities noted.   Throat: No oral lesions, no thrush, oral mucosa moist.   Neck: Supple, trachea midline, no thyromegaly.   Back:   No kyphoscoliosis present. No tenderness to palpation.   Lungs:    Breath sounds heard bilaterally equally.  No crackles or wheezing. No Pleural rub or bronchial breathing.   Heart:  Normal S1 and S2, no murmur, no gallop, no rub. No JVD.   Abdomen:   Normal bowel sounds, no masses, no organomegaly. Soft, nontender, obese, no rebound tenderness.   Extremities: Supple, no edema, no cyanosis, no clubbing.  Right wrist access site looks clean.   Pulses: Pulses palpable bilaterally.   Skin: No bleeding or rash.   Neurologic: Alert and oriented x 3. No facial asymmetry. Moves all four limbs. No tremors.     Pertinent Lab Results:     Results from last 7 days   Lab Units 12/16/24  0843 12/15/24  1651   SODIUM mmol/L 140 141   POTASSIUM mmol/L 4.1 4.4   CHLORIDE mmol/L 104 102   CO2 mmol/L 24.7 26.1   BUN mg/dL 14 12   CREATININE mg/dL 1.08 1.19   CALCIUM mg/dL 8.9 9.3   BILIRUBIN mg/dL  --  0.3   ALK PHOS U/L  --  86   ALT (SGPT) U/L  --  64*   AST (SGOT) U/L  --  39   GLUCOSE mg/dL 108* 132*     Results from last 7 days   Lab Units 12/16/24  0843 12/15/24  1651   WBC 10*3/mm3 5.39 7.60   HEMOGLOBIN g/dL 14.7 15.1   HEMATOCRIT % 43.8 44.3   PLATELETS 10*3/mm3 238 258       Results from last 7 days   Lab Units 12/15/24  1752 12/15/24  1651   HSTROP T ng/L <6 <6       Results from last 7 days   Lab Units 12/15/24  1752   LIPASE U/L 21     Pertinent Radiology Results:    Imaging Results (All)       Procedure Component Value Units Date/Time    CT Angiogram Chest Pulmonary Embolism [[id number]] Collected: 12/15/24 1957     Updated: 12/15/24 1958    Narrative:      FINAL REPORT    TECHNIQUE:  null    CLINICAL HISTORY:  Rule out PE    COMPARISON:  null    FINDINGS:  CT angiogram of chest with MIP postprocessing:    Comparison: None    FINDINGS:    Pulmonary arteries:    Pulmonary arteries are well-opacified, free of intraluminal thrombus.    Thoracic aorta:    Normal caliber without dissection.    Mediastinum/heart:    Heart size is normal. Normal RV/LV ratio. No pericardial effusion.    No  enlarged mediastinal, hilar, or axillary lymph nodes.    Trachea and mainstem bronchi of normal caliber without intraluminal lesion.    Visualized thyroid gland without mass or enlargement.    Esophagus without significant distention or evidence of mass.    Lungs/pleura:    Lungs are well expanded, free of infiltrates, soft tissue nodule, or chronic interstitial change.    No pleural effusion or pneumothorax.    Uppermost abdomen:    No adrenal mass. Contracted gallbladder without radiopaque/radiolucent gallstones.    Bone/MSK:    Moderate spondylosis of the thoracic spine.    No acute fracture involving the visualized bony thorax.    No destructive osseous lesion.      Impression:      IMPRESSION:    1. Negative for acute pulmonary artery embolism. Thoracic aorta of normal caliber without dissection.    2. No consolidation, pleural effusion, or pneumothorax.    3. Other findings above.    Authenticated and Electronically Signed by Jack Lane MD on  12/15/2024 07:57:09 PM    CT Abdomen Pelvis With Contrast [[id number]] Collected: 12/15/24 1940     Updated: 12/15/24 1942    Narrative:      FINAL REPORT    TECHNIQUE:  null    CLINICAL HISTORY:  Rule out dissection    COMPARISON:  null    FINDINGS:  CT abdomen and pelvis with IV contrast.    Comparison: None    FINDINGS:    Lung bases:    No soft tissue nodule, infiltrate, or pleural effusion.    Upper Abdomen:    The liver, spleen, and pancreas are within normal limits.    Gallbladder is contracted without intraluminal radiopaque/radiolucent calculus. No biliary dilatation.    Retroperitoneum/Pelvis:    No adrenal mass.    Abdominal aorta is of normal caliber without significant calcific atheromatous change. No abdominal aortic dissection.    Retroaortic left renal vein without significant stenosis.    No pathologic para-aortic adenopathy.    Normal-sized kidneys. Hyperdense renal pyramids bilaterally may reflect microcalcifications. No renal mass, hydronephrosis or  nephrolithiasis.    Nondilated ureters without calculus. Normal-appearing urinary bladder.    Bowel/Mesentery:    No colonic obstruction or inflammation, or diverticular disease.    Normal appendix positioned lateral to the inferior margin of the right lobe of the liver.    Small bowel without dilatation or mural thickening. Midabdomen mesenteric edema and mild adenopathy.    Mildly distended stomach without focal lesion.    The superior mesenteric artery and vein are patent.    Bone/Extraperitoneal Soft Tissues:    Mild spondylosis and scoliosis of the lumbar spine.    Unremarkable bony pelvis.    Extraperitoneal soft tissues of the abdomen and pelvis are unremarkable.      Impression:      IMPRESSION:    1. Normal caliber abdominal aorta without dissection. Patent mesenteric branches.    2. Midabdomen mesenteric edema and adenopathy may reflect mesenteric panniculitis.    3. Other findings above.    Authenticated and Electronically Signed by Jack Lane MD on  12/15/2024 07:40:48 PM    XR Chest 1 View [[id number]] Collected: 12/15/24 1647     Updated: 12/15/24 1649    Narrative:      PROCEDURE: XR CHEST 1 VW-     HISTORY: Chest Pain Triage Protocol     COMPARISON: None.     FINDINGS: The heart is normal in size. The lungs are clear. The  mediastinum is unremarkable. There is no pneumothorax.  There are no  acute osseous abnormalities. Patient is rotated to the right. Decreased  inspiratory effort noted.       Impression:      No acute cardiopulmonary process.      This report was signed and finalized on 12/15/2024 4:47 PM by Vivian Morgan MD.        Condition on Discharge:      Stable.    Code status during the hospital stay:    Code Status and Medical Interventions: CPR (Attempt to Resuscitate); Full Support   Ordered at: 12/15/24 2112     Code Status (Patient has no pulse and is not breathing):    CPR (Attempt to Resuscitate)     Medical Interventions (Patient has pulse or is breathing):    Full Support      Discharge Disposition:    Home or Self Care    Discharge Medications:       Discharge Medications        Continue These Medications        Instructions Start Date   acetaminophen 500 MG tablet  Commonly known as: TYLENOL   500 mg, Oral, Every 6 Hours PRN      aspirin 81 MG EC tablet   81 mg, Oral, Daily      atorvastatin 20 MG tablet  Commonly known as: LIPITOR   20 mg, Oral, Daily      omeprazole 20 MG capsule  Commonly known as: priLOSEC   20 mg, Daily             Stop These Medications      cetirizine 10 MG tablet  Commonly known as: zyrTEC     cyclobenzaprine 10 MG tablet  Commonly known as: FLEXERIL     ibuprofen 600 MG tablet  Commonly known as: ADVIL,MOTRIN     multivitamin with minerals tablet tablet     ondansetron ODT 4 MG disintegrating tablet  Commonly known as: ZOFRAN-ODT     oxyCODONE 5 MG immediate release tablet  Commonly known as: Roxicodone     Pancrelipase (Lip-Prot-Amyl) [id number] units capsule delayed-release particles capsule  Commonly known as: CREON     tamsulosin 0.4 MG capsule 24 hr capsule  Commonly known as: FLOMAX            Discharge Diet:     Diet Instructions       Diet: Cardiac Diets; Healthy Heart (2-3 Na+); Regular (IDDSI 7); Thin (IDDSI 0)      Discharge Diet: Cardiac Diets    Cardiac Diet: Healthy Heart (2-3 Na+)    Texture: Regular (IDDSI 7)    Fluid Consistency: Thin (IDDSI 0)          Activity at Discharge:     Activity Instructions       Activity as Tolerated            Follow-up Appointments:     Follow-up Information       Juan Ramon Bianchi MD. Go on 12/26/2024.    Specialty: Family Medicine  Why: @ 2:00pm  Contact information:  [address]  [facility] [address] [phone]                           Test Results Pending at Discharge:    None.     Steffen Lay MD  12/17/24  12:05 EST    Time: I spent 25 minutes on this discharge activity which included: face-to-face encounter with the patient, reviewing the data in the system, coordination of the care  with the nursing staff as well as consultants, documentation, and entering orders.     Dictated utilizing Dragon dictation.

## (undated) DEVICE — GUIDE CATHETER: Brand: MACH1™

## (undated) DEVICE — RADIFOCUS OPTITORQUE ANGIOGRAPHIC CATHETER: Brand: OPTITORQUE

## (undated) DEVICE — GLIDESHEATH SLENDER STAINLESS STEEL KIT: Brand: GLIDESHEATH SLENDER

## (undated) DEVICE — DGW .035 FC J3MM 260CM TEF: Brand: EMERALD

## (undated) DEVICE — TR BAND RADIAL ARTERY COMPRESSION DEVICE: Brand: TR BAND

## (undated) DEVICE — GW INQWIRE FC PTFE STD J/1.5 .035 260

## (undated) DEVICE — CATH F6 ST JR 4 100CM: Brand: SUPERTORQUE